# Patient Record
Sex: FEMALE | Race: BLACK OR AFRICAN AMERICAN | Employment: FULL TIME | URBAN - METROPOLITAN AREA
[De-identification: names, ages, dates, MRNs, and addresses within clinical notes are randomized per-mention and may not be internally consistent; named-entity substitution may affect disease eponyms.]

---

## 2018-06-20 PROBLEM — O09.899 HISTORY OF PRETERM DELIVERY, CURRENTLY PREGNANT: Status: ACTIVE | Noted: 2018-06-20

## 2018-06-20 PROBLEM — O09.819 ENCOUNTER FOR SUPERVISION OF PREGNANCY RESULTING FROM ASSISTED REPRODUCTIVE TECHNOLOGY: Status: ACTIVE | Noted: 2018-06-20

## 2018-06-20 PROBLEM — O09.529 ADVANCED MATERNAL AGE IN MULTIGRAVIDA: Status: ACTIVE | Noted: 2018-06-20

## 2018-06-20 PROCEDURE — 87086 URINE CULTURE/COLONY COUNT: CPT | Performed by: OBSTETRICS & GYNECOLOGY

## 2018-06-26 PROBLEM — B19.10 HEPATITIS B: Status: ACTIVE | Noted: 2018-02-01

## 2018-06-26 NOTE — PROGRESS NOTES
Outpatient Maternal-Fetal Medicine Consultation    Dear Dr. Stearns Post,    Thank you for requesting maternal fetal medicine consultation on your patient Adrian Asencio. As you are aware she is a 34year old female with a Escobar pregnancy; ALBERT 1/23/18. Ht 5' 2\" (1.575 m)   Wt 171 lb (77.6 kg)   BMI 31.28 kg/m²   General: alert and oriented,no acute distress      DISCUSSION  During her visit we discussed and reviewed the following issues:  Assisted Reproductive technology -   Conception by IVF is [facility] is more than adequate to assess for gestational diabetes and preeclampsia; hence, no further significant alterations in obstetric care are advised.     Medical Complications    Women 28years of age or older can expect to experience two to three fold higher for women 35-39 years and at 26 weeks for women 40 years and older. Fetal Malformations    Cardiac malformations, clubfoot, and diaphragmatic hernia appear to occur with increased frequency in offspring of older women.  These abnormalities are structural first was at age 10 years (in 10). The second was in 2001, she was completely dilated when she presented to the hospital.  She was kept for almost a week but they could not stop labor and she delivered. Her baby lived a few hours.   She recalls her phy a complication of infections unrelated to the genital tract, such as appendicitis, cholecystitis, pneumonia, periodontal disease, and pyelonephritis.  This likely results from transient low-grade bacteremia and/or endotoxemia leading to systemic inflammator and her . We talked about potential risks and benefits associated with tocolytic therapy and  steroid.                We discussed the preventive therapy for  labor and  delivery with 17-OH progesterone caproate 250 mg IM week anomalies, first trimester aneuploidy screening, or chorionic villus sampling prior to the procedure, if indicated. Most cerclages are placed via a vaginal approach.  The transabdominal approach is more invasive, but allows higher placement since the tra the potential for serious long-term neurodevelopmental disability.     Transabdominal placement of a cerclage at the cervicoisthmic junction appears to be a safe and effective procedure for reducing the incidence of spontaneous pregnancy loss in selected pa chorioamnionitis or (2) the pregnancy is at least 32 weeks of gestation.  In the absence of clinically apparent infection, we feel the possible increased risk of premature delivery with cerclage removal before 32 weeks outweighs the possible increased risk

## 2018-06-27 ENCOUNTER — OFFICE VISIT (OUTPATIENT)
Dept: PERINATAL CARE | Facility: HOSPITAL | Age: 38
End: 2018-06-27
Attending: OBSTETRICS & GYNECOLOGY
Payer: COMMERCIAL

## 2018-06-27 VITALS
HEIGHT: 62 IN | WEIGHT: 171 LBS | BODY MASS INDEX: 31.47 KG/M2 | SYSTOLIC BLOOD PRESSURE: 141 MMHG | DIASTOLIC BLOOD PRESSURE: 73 MMHG | HEART RATE: 81 BPM

## 2018-06-27 DIAGNOSIS — O09.899 HISTORY OF PRETERM DELIVERY, CURRENTLY PREGNANT: ICD-10-CM

## 2018-06-27 DIAGNOSIS — O09.521 ELDERLY MULTIGRAVIDA IN FIRST TRIMESTER: ICD-10-CM

## 2018-06-27 DIAGNOSIS — O34.31 CERVICAL INCOMPETENCE DURING PREGNANCY IN FIRST TRIMESTER: ICD-10-CM

## 2018-06-27 PROBLEM — O34.30 INCOMPETENT CERVIX IN PREGNANCY: Status: ACTIVE | Noted: 2018-06-27

## 2018-06-27 PROCEDURE — 99244 OFF/OP CNSLTJ NEW/EST MOD 40: CPT | Performed by: OBSTETRICS & GYNECOLOGY

## 2018-06-27 RX ORDER — CHOLECALCIFEROL (VITAMIN D3) 25 MCG
1 TABLET,CHEWABLE ORAL DAILY
COMMUNITY
End: 2018-10-31

## 2018-07-09 ENCOUNTER — TELEPHONE (OUTPATIENT)
Dept: OBGYN UNIT | Facility: HOSPITAL | Age: 38
End: 2018-07-09

## 2018-07-10 ENCOUNTER — TELEPHONE (OUTPATIENT)
Dept: OBGYN UNIT | Facility: HOSPITAL | Age: 38
End: 2018-07-10

## 2018-07-10 NOTE — PROGRESS NOTES
Giovanny Chacko  Dear Dr. Daryn Davis,     Thank you for requesting ultrasound evaluation and maternal fetal medicine consultation on your patient Adrian Asencio.   As you are aware she is a 34year old female  with a Si views. Feet: both visible but suboptimal views. Fetal heart activity: present. Fetal heart rate: 158 bpm.   Amniotic fluid: normal.   Placenta: anterior.      Summary of Ultrasound Findings:    Impression: The CRL is consistent with the gestational age advised for these patients including a comprehensive ultrasound to assess for fetal malformations (at 20 weeks) and a third trimester ultrasound assessment for fetal growth (at 32 weeks).  In addition, weekly NST's (initiating at 36 weeks gestation for wome older women does increase the chance that a women will be induced (71 inductions per fetal death averted) and thereby increases her risk of having a  delivery, only 14 additional cesareans would need to be performed to avert one unexplained fetal d  morbidity and mortality. Approximately 20 percent of  deliveries are iatrogenic and are performed for maternal or fetal indications, such as intrauterine growth restriction, preeclampsia, placenta previa, or nonreassuring fetal testing.  Of risk of  birth. Cocaine is the most common illicit substance associated with  labor in the [place], and has been detected in approximately 60 percent of women in  labor who have positive toxicology tests.  Alcohol and toluene are treatment.     Cervical cerclage was reviewed.   See prior [facility] note for details.   I strongly advise that she go on the 17-OHPC therapy [facility]), but I also recommend a prophylactic cerclage since she was told her cervix was incompetent can that she had no

## 2018-07-11 ENCOUNTER — ANESTHESIA (OUTPATIENT)
Dept: OBGYN UNIT | Facility: HOSPITAL | Age: 38
End: 2018-07-11

## 2018-07-11 ENCOUNTER — ANESTHESIA EVENT (OUTPATIENT)
Dept: OBGYN UNIT | Facility: HOSPITAL | Age: 38
End: 2018-07-11

## 2018-07-11 ENCOUNTER — SURGERY (OUTPATIENT)
Age: 38
End: 2018-07-11

## 2018-07-11 ENCOUNTER — OFFICE VISIT (OUTPATIENT)
Dept: PERINATAL CARE | Facility: HOSPITAL | Age: 38
End: 2018-07-11
Attending: OBSTETRICS & GYNECOLOGY
Payer: COMMERCIAL

## 2018-07-11 ENCOUNTER — HOSPITAL ENCOUNTER (OUTPATIENT)
Facility: HOSPITAL | Age: 38
Setting detail: OBSERVATION
Discharge: HOME | End: 2018-07-11
Attending: OBSTETRICS & GYNECOLOGY | Admitting: OBSTETRICS & GYNECOLOGY
Payer: COMMERCIAL

## 2018-07-11 VITALS
SYSTOLIC BLOOD PRESSURE: 117 MMHG | HEART RATE: 67 BPM | BODY MASS INDEX: 31.47 KG/M2 | OXYGEN SATURATION: 100 % | TEMPERATURE: 98 F | HEIGHT: 62 IN | DIASTOLIC BLOOD PRESSURE: 60 MMHG | RESPIRATION RATE: 18 BRPM | WEIGHT: 171 LBS

## 2018-07-11 VITALS
DIASTOLIC BLOOD PRESSURE: 62 MMHG | HEIGHT: 62 IN | BODY MASS INDEX: 31.47 KG/M2 | SYSTOLIC BLOOD PRESSURE: 121 MMHG | WEIGHT: 171 LBS | HEART RATE: 80 BPM

## 2018-07-11 DIAGNOSIS — O09.899 HISTORY OF PRETERM DELIVERY, CURRENTLY PREGNANT: ICD-10-CM

## 2018-07-11 DIAGNOSIS — O34.31 CERVICAL INCOMPETENCE DURING PREGNANCY IN FIRST TRIMESTER: ICD-10-CM

## 2018-07-11 DIAGNOSIS — O09.521 ELDERLY MULTIGRAVIDA IN FIRST TRIMESTER: ICD-10-CM

## 2018-07-11 PROBLEM — Z34.90 PREGNANCY: Status: ACTIVE | Noted: 2018-07-11

## 2018-07-11 LAB
BASOPHILS # BLD AUTO: 0.02 X10(3) UL (ref 0–0.1)
BASOPHILS NFR BLD AUTO: 0.4 %
EOSINOPHIL # BLD AUTO: 0.34 X10(3) UL (ref 0–0.3)
EOSINOPHIL NFR BLD AUTO: 6.3 %
ERYTHROCYTE [DISTWIDTH] IN BLOOD BY AUTOMATED COUNT: 12.5 % (ref 11.5–16)
HCT VFR BLD AUTO: 39.1 % (ref 34–50)
HGB BLD-MCNC: 13.2 G/DL (ref 12–16)
IMMATURE GRANULOCYTE COUNT: 0.01 X10(3) UL (ref 0–1)
IMMATURE GRANULOCYTE RATIO %: 0.2 %
LYMPHOCYTES # BLD AUTO: 1.47 X10(3) UL (ref 0.9–4)
LYMPHOCYTES NFR BLD AUTO: 27.2 %
MCH RBC QN AUTO: 29.5 PG (ref 27–33.2)
MCHC RBC AUTO-ENTMCNC: 33.8 G/DL (ref 31–37)
MCV RBC AUTO: 87.3 FL (ref 81–100)
MONOCYTES # BLD AUTO: 0.42 X10(3) UL (ref 0.1–1)
MONOCYTES NFR BLD AUTO: 7.8 %
NEUTROPHIL ABS PRELIM: 3.15 X10 (3) UL (ref 1.3–6.7)
NEUTROPHILS # BLD AUTO: 3.15 X10(3) UL (ref 1.3–6.7)
NEUTROPHILS NFR BLD AUTO: 58.1 %
PLATELET # BLD AUTO: 355 10(3)UL (ref 150–450)
RBC # BLD AUTO: 4.48 X10(6)UL (ref 3.8–5.1)
RED CELL DISTRIBUTION WIDTH-SD: 39.9 FL (ref 35.1–46.3)
WBC # BLD AUTO: 5.4 X10(3) UL (ref 4–13)

## 2018-07-11 PROCEDURE — 76813 OB US NUCHAL MEAS 1 GEST: CPT | Performed by: OBSTETRICS & GYNECOLOGY

## 2018-07-11 PROCEDURE — 59320 REVISION OF CERVIX: CPT

## 2018-07-11 PROCEDURE — 0UVC7ZZ RESTRICTION OF CERVIX, VIA NATURAL OR ARTIFICIAL OPENING: ICD-10-PCS | Performed by: OBSTETRICS & GYNECOLOGY

## 2018-07-11 PROCEDURE — 85025 COMPLETE CBC W/AUTO DIFF WBC: CPT | Performed by: OBSTETRICS & GYNECOLOGY

## 2018-07-11 PROCEDURE — 99214 OFFICE O/P EST MOD 30 MIN: CPT | Performed by: OBSTETRICS & GYNECOLOGY

## 2018-07-11 RX ORDER — ACETAMINOPHEN 325 MG/1
TABLET ORAL
Status: COMPLETED
Start: 2018-07-11 | End: 2018-07-11

## 2018-07-11 RX ORDER — SODIUM CHLORIDE, SODIUM LACTATE, POTASSIUM CHLORIDE, CALCIUM CHLORIDE 600; 310; 30; 20 MG/100ML; MG/100ML; MG/100ML; MG/100ML
INJECTION, SOLUTION INTRAVENOUS CONTINUOUS
Status: DISCONTINUED | OUTPATIENT
Start: 2018-07-11 | End: 2018-07-11

## 2018-07-11 RX ORDER — NALBUPHINE HCL 10 MG/ML
2.5 AMPUL (ML) INJECTION
Status: DISCONTINUED | OUTPATIENT
Start: 2018-07-11 | End: 2018-07-11

## 2018-07-11 RX ORDER — ACETAMINOPHEN 325 MG/1
650 TABLET ORAL ONCE AS NEEDED
Status: COMPLETED | OUTPATIENT
Start: 2018-07-11 | End: 2018-07-11

## 2018-07-11 RX ORDER — INDOMETHACIN 50 MG/1
50 CAPSULE ORAL EVERY 6 HOURS PRN
Status: DISCONTINUED | OUTPATIENT
Start: 2018-07-11 | End: 2018-07-11

## 2018-07-11 RX ORDER — INDOMETHACIN 50 MG/1
50 CAPSULE ORAL EVERY 6 HOURS PRN
Qty: 7 CAPSULE | Refills: 0 | Status: ON HOLD | OUTPATIENT
Start: 2018-07-11 | End: 2018-09-26

## 2018-07-11 RX ORDER — CEFAZOLIN SODIUM/WATER 2 G/20 ML
2 SYRINGE (ML) INTRAVENOUS ONCE
Status: COMPLETED | OUTPATIENT
Start: 2018-07-11 | End: 2018-07-11

## 2018-07-11 RX ORDER — ONDANSETRON 2 MG/ML
4 INJECTION INTRAMUSCULAR; INTRAVENOUS ONCE AS NEEDED
Status: DISCONTINUED | OUTPATIENT
Start: 2018-07-11 | End: 2018-07-11

## 2018-07-11 RX ORDER — DIPHENHYDRAMINE HYDROCHLORIDE 50 MG/ML
25 INJECTION INTRAMUSCULAR; INTRAVENOUS ONCE AS NEEDED
Status: DISCONTINUED | OUTPATIENT
Start: 2018-07-11 | End: 2018-07-11

## 2018-07-11 NOTE — PLAN OF CARE
Problem: Patient/Family Goals  Goal: Patient/Family Long Term Goal  Patient's Long Term Goal: uncomplicated cerclage placement    Interventions:  -   - See additional Care Plan goals for specific interventions   Outcome: Progressing    Goal: Patient/Family

## 2018-07-11 NOTE — PROGRESS NOTES
Pt moved via pt cart to antepartum room 118, IV infusing well. Pt without urge to void as yet. Peripad applied prior to transfer.

## 2018-07-11 NOTE — OPERATIVE REPORT
Preoperative Diagnosis:  1. IUP 12w0d                                      2.   H/O Incompetent cervix                                         Postoperative diagnosis:    Same    Procedure:   Cervical Cerclage    Surgeon:  NOREEN Hong Blo

## 2018-07-11 NOTE — PROGRESS NOTES
Pt is a 34year old female admitted to TRG1/TRG1-A, Patient presents with:   Assessment: cerclage placement     Pt is 12w0d intra-uterine pregnancy. Denies any leaking of fluid. Reports +fetal movement. History obtained, consents signed.  Shy Jean-Baptiste

## 2018-07-11 NOTE — PROGRESS NOTES
Pt. Here for 1st trimester ultrasound. Pt. Accompanied by mother -in-law. No fetal movement.   No complaints

## 2018-07-11 NOTE — ANESTHESIA POSTPROCEDURE EVALUATION
[address] Patient Status:  Observation   Age/Gender 34year old female MRN [id number]   Location [facility] Attending Declan Conroy, MD Paresh Banerjee Day # 0 PCP No primary care provider on file.        Anesthesia P

## 2018-07-11 NOTE — H&P
[facility] H&P    Date of Admission:  2018  Date of Consult:  2018      History of Present Illness:  Curt Acuna is a a(n) 34year old female.   with an IUP at Gestational Age: 13w0d with a h/o infusion, , Intravenous, Continuous  •  ceFAZolin sodium (ANCEF/KEFZOL) 2 GM/20ML premix IV syringe 2 g, 2 g, Intravenous, Once    Physical examination:  Physical Exam  Physical Exam:   General: Alert, orientated x3. Cooperative. No apparent distress.   V

## 2018-07-12 NOTE — PLAN OF CARE
Pt able to void another 200 cc. Ambulated to bathroom, tolerated well. Pt denies any cramping, bleeding, or concerns. Discharge instructions reviewed with patient, indocin prescription reviewed with patient.  labor s/s reviewed with patient.  Pt awar

## 2018-07-16 NOTE — PROGRESS NOTES
Stefanie Fagan    Dear Dr. Manuel Mike    Thank you for requesting ultrasound evaluation and maternal fetal medicine consultation on your patient Angela Schulte.   As you are aware she is a 34year old female  with a Singl donor ~35 (her partner)  · Hepatitis B - noted in her history but not discussed today     RECOMMENDATIONS:  · Continue care with Dr. Borjas Fairly  · Begin weekly 17-OHPC at 16 weeks  · Level II ultrasound & CL at 20 weeks  · Fetal echocardiogram at ~24 weeks  · F

## 2018-07-17 ENCOUNTER — OFFICE VISIT (OUTPATIENT)
Dept: PERINATAL CARE | Facility: HOSPITAL | Age: 38
End: 2018-07-17
Attending: OBSTETRICS & GYNECOLOGY
Payer: COMMERCIAL

## 2018-07-17 VITALS — HEART RATE: 84 BPM | DIASTOLIC BLOOD PRESSURE: 64 MMHG | SYSTOLIC BLOOD PRESSURE: 130 MMHG

## 2018-07-17 DIAGNOSIS — O09.521 ELDERLY MULTIGRAVIDA IN FIRST TRIMESTER: ICD-10-CM

## 2018-07-17 DIAGNOSIS — O34.31 CERVICAL INCOMPETENCE DURING PREGNANCY IN FIRST TRIMESTER: ICD-10-CM

## 2018-07-17 PROCEDURE — 76817 TRANSVAGINAL US OBSTETRIC: CPT | Performed by: OBSTETRICS & GYNECOLOGY

## 2018-07-17 PROCEDURE — 99213 OFFICE O/P EST LOW 20 MIN: CPT | Performed by: OBSTETRICS & GYNECOLOGY

## 2018-07-25 ENCOUNTER — LAB ENCOUNTER (OUTPATIENT)
Dept: LAB | Facility: HOSPITAL | Age: 38
End: 2018-07-25
Attending: OBSTETRICS & GYNECOLOGY
Payer: COMMERCIAL

## 2018-07-25 DIAGNOSIS — Z36.9 ENCOUNTER FOR ANTENATAL SCREENING OF MOTHER: ICD-10-CM

## 2018-07-25 LAB
ANTIBODY SCREEN: NEGATIVE
BASOPHILS # BLD AUTO: 0.03 X10(3) UL (ref 0–0.1)
BASOPHILS NFR BLD AUTO: 0.5 %
EOSINOPHIL # BLD AUTO: 0.48 X10(3) UL (ref 0–0.3)
EOSINOPHIL NFR BLD AUTO: 7.6 %
ERYTHROCYTE [DISTWIDTH] IN BLOOD BY AUTOMATED COUNT: 12.6 % (ref 11.5–16)
HBV SURFACE AG SER-ACNC: <0.1 [IU]/L
HBV SURFACE AG SERPL QL IA: NONREACTIVE
HCT VFR BLD AUTO: 35.7 % (ref 34–50)
HGB BLD-MCNC: 11.9 G/DL (ref 12–16)
IMMATURE GRANULOCYTE COUNT: 0.02 X10(3) UL (ref 0–1)
IMMATURE GRANULOCYTE RATIO %: 0.3 %
LYMPHOCYTES # BLD AUTO: 1.4 X10(3) UL (ref 0.9–4)
LYMPHOCYTES NFR BLD AUTO: 22.2 %
MCH RBC QN AUTO: 29.7 PG (ref 27–33.2)
MCHC RBC AUTO-ENTMCNC: 33.3 G/DL (ref 31–37)
MCV RBC AUTO: 89 FL (ref 81–100)
MONOCYTES # BLD AUTO: 0.46 X10(3) UL (ref 0.1–1)
MONOCYTES NFR BLD AUTO: 7.3 %
NEUTROPHIL ABS PRELIM: 3.93 X10 (3) UL (ref 1.3–6.7)
NEUTROPHILS # BLD AUTO: 3.93 X10(3) UL (ref 1.3–6.7)
NEUTROPHILS NFR BLD AUTO: 62.1 %
PLATELET # BLD AUTO: 355 10(3)UL (ref 150–450)
RBC # BLD AUTO: 4.01 X10(6)UL (ref 3.8–5.1)
RED CELL DISTRIBUTION WIDTH-SD: 41.6 FL (ref 35.1–46.3)
RH BLOOD TYPE: POSITIVE
RUBV IGG SER QL: POSITIVE
RUBV IGG SER-ACNC: 153.1 IU/ML (ref 10–?)
T PALLIDUM AB SER QL IA: NONREACTIVE
WBC # BLD AUTO: 6.3 X10(3) UL (ref 4–13)

## 2018-07-25 PROCEDURE — 86900 BLOOD TYPING SEROLOGIC ABO: CPT

## 2018-07-25 PROCEDURE — 36415 COLL VENOUS BLD VENIPUNCTURE: CPT

## 2018-07-25 PROCEDURE — 85025 COMPLETE CBC W/AUTO DIFF WBC: CPT

## 2018-07-25 PROCEDURE — 87389 HIV-1 AG W/HIV-1&-2 AB AG IA: CPT

## 2018-07-25 PROCEDURE — 86850 RBC ANTIBODY SCREEN: CPT

## 2018-07-25 PROCEDURE — 87340 HEPATITIS B SURFACE AG IA: CPT

## 2018-07-25 PROCEDURE — 86780 TREPONEMA PALLIDUM: CPT

## 2018-07-25 PROCEDURE — 86762 RUBELLA ANTIBODY: CPT

## 2018-07-25 PROCEDURE — 86901 BLOOD TYPING SEROLOGIC RH(D): CPT

## 2018-09-04 NOTE — PROGRESS NOTES
Radha Walden  Dear Dr. Thierno Batres,     Thank you for requesting ultrasound evaluation and maternal fetal medicine consultation on your patient Roberto Gonzalez.   As you are aware she is a 34year old female  with a Si 22w1d)  .6 mm 81st% 21w1d (20w3d to 21w6d)  FL 32.4 mm 46th% 20w1d (18w2d to 21w6d)  OFD 64.6 mm 91st% 21w0d  TCD 21.2 mm 72nd% 20w5d  HUM 30.9 mm 62nd% 20w2d  VENTRp 6.5 mm n/a  CM 5.5 mm 67th%  NUCHAL FOLD 4.03 mm  HC/AC Ratio 1.141  29th%  FL/AC R mm  Cervical length with fundal pressure: 31 mm. Cervix pre-stitch: 4.0 mm. Cervix post-stitch: 26.0 mm.  ____________________________________________________________________________  Comments:  Impression:  1. IUP at 20w0d  2.   Normal level II ultras

## 2018-09-05 ENCOUNTER — OFFICE VISIT (OUTPATIENT)
Dept: PERINATAL CARE | Facility: HOSPITAL | Age: 38
End: 2018-09-05
Attending: OBSTETRICS & GYNECOLOGY
Payer: COMMERCIAL

## 2018-09-05 VITALS
WEIGHT: 180 LBS | SYSTOLIC BLOOD PRESSURE: 142 MMHG | DIASTOLIC BLOOD PRESSURE: 71 MMHG | BODY MASS INDEX: 33 KG/M2 | HEART RATE: 77 BPM

## 2018-09-05 DIAGNOSIS — O09.899 HISTORY OF PRETERM DELIVERY, CURRENTLY PREGNANT: ICD-10-CM

## 2018-09-05 DIAGNOSIS — O09.522 ELDERLY MULTIGRAVIDA IN SECOND TRIMESTER: ICD-10-CM

## 2018-09-05 DIAGNOSIS — O34.32 CERVICAL INCOMPETENCE DURING PREGNANCY IN SECOND TRIMESTER: ICD-10-CM

## 2018-09-05 PROCEDURE — 99215 OFFICE O/P EST HI 40 MIN: CPT | Performed by: OBSTETRICS & GYNECOLOGY

## 2018-09-05 PROCEDURE — 76817 TRANSVAGINAL US OBSTETRIC: CPT | Performed by: OBSTETRICS & GYNECOLOGY

## 2018-09-05 PROCEDURE — 76811 OB US DETAILED SNGL FETUS: CPT | Performed by: OBSTETRICS & GYNECOLOGY

## 2018-09-25 NOTE — PROGRESS NOTES
Beena Veronica  Dear Dr. Wilfrid Valles,     Thank you for requesting ultrasound evaluation and maternal fetal medicine consultation on your patient Idalia Torres.  As you are aware she is a 34year old female  with a Si Normal  Position of apex:  normal  Systemic veins:  Normal  Pulmonary veins:  Normal  Atrial septum:  Normal  Atrioventricular junction:  Concordant, patent AV valves, equal size  Atrioventricular valve regurgit.:  None  Ventricular septum:  Intact  Ventr results with the patient. Impression:  1. IUP at 23w0d  2. Normal fetal echocardiogram  3.   Incompetent cervix s/p cerclage but there is funneling past the cerclage suture.       I interpreted the results and reviewed them with the patient.     DIS Jennifer  · Currently admit for observation and repeat CL tomorrow  · Begin nightly vaginal progesterone 9/26/18    Outpatient plan -   · Follow-up Growth ultrasound at 32 weeks. · Weekly NST's at 34 weeks.   · Twice weekly testing at 38 weeks - weekly NST an

## 2018-09-26 ENCOUNTER — OFFICE VISIT (OUTPATIENT)
Dept: PERINATAL CARE | Facility: HOSPITAL | Age: 38
End: 2018-09-26
Attending: OBSTETRICS & GYNECOLOGY
Payer: COMMERCIAL

## 2018-09-26 ENCOUNTER — HOSPITAL ENCOUNTER (INPATIENT)
Facility: HOSPITAL | Age: 38
LOS: 12 days | Discharge: HOME | DRG: 819 | End: 2018-10-08
Attending: OBSTETRICS & GYNECOLOGY | Admitting: OBSTETRICS & GYNECOLOGY
Payer: COMMERCIAL

## 2018-09-26 VITALS
WEIGHT: 180 LBS | BODY MASS INDEX: 33.13 KG/M2 | HEIGHT: 62 IN | DIASTOLIC BLOOD PRESSURE: 76 MMHG | HEART RATE: 80 BPM | SYSTOLIC BLOOD PRESSURE: 132 MMHG

## 2018-09-26 DIAGNOSIS — O34.32 CERVICAL INCOMPETENCE DURING PREGNANCY IN SECOND TRIMESTER: ICD-10-CM

## 2018-09-26 DIAGNOSIS — O09.819 ENCOUNTER FOR SUPERVISION OF PREGNANCY RESULTING FROM ASSISTED REPRODUCTIVE TECHNOLOGY: ICD-10-CM

## 2018-09-26 DIAGNOSIS — O09.522 ELDERLY MULTIGRAVIDA IN SECOND TRIMESTER: ICD-10-CM

## 2018-09-26 PROCEDURE — 76827 ECHO EXAM OF FETAL HEART: CPT | Performed by: OBSTETRICS & GYNECOLOGY

## 2018-09-26 PROCEDURE — 76825 ECHO EXAM OF FETAL HEART: CPT | Performed by: OBSTETRICS & GYNECOLOGY

## 2018-09-26 PROCEDURE — 76817 TRANSVAGINAL US OBSTETRIC: CPT | Performed by: OBSTETRICS & GYNECOLOGY

## 2018-09-26 PROCEDURE — 93325 DOPPLER ECHO COLOR FLOW MAPG: CPT | Performed by: OBSTETRICS & GYNECOLOGY

## 2018-09-26 PROCEDURE — 81003 URINALYSIS AUTO W/O SCOPE: CPT | Performed by: OBSTETRICS & GYNECOLOGY

## 2018-09-26 PROCEDURE — 99215 OFFICE O/P EST HI 40 MIN: CPT | Performed by: OBSTETRICS & GYNECOLOGY

## 2018-09-26 PROCEDURE — 85025 COMPLETE CBC W/AUTO DIFF WBC: CPT | Performed by: OBSTETRICS & GYNECOLOGY

## 2018-09-26 RX ORDER — ACETAMINOPHEN 500 MG
1000 TABLET ORAL EVERY 6 HOURS PRN
Status: DISCONTINUED | OUTPATIENT
Start: 2018-09-26 | End: 2018-10-08

## 2018-09-26 RX ORDER — DOCUSATE SODIUM 100 MG/1
100 CAPSULE, LIQUID FILLED ORAL 2 TIMES DAILY
Status: DISCONTINUED | OUTPATIENT
Start: 2018-09-26 | End: 2018-10-08

## 2018-09-26 RX ORDER — CHOLECALCIFEROL (VITAMIN D3) 25 MCG
1 TABLET,CHEWABLE ORAL NIGHTLY
Status: DISCONTINUED | OUTPATIENT
Start: 2018-09-26 | End: 2018-09-29

## 2018-09-26 RX ORDER — ACETAMINOPHEN 500 MG
500 TABLET ORAL EVERY 6 HOURS PRN
Status: DISCONTINUED | OUTPATIENT
Start: 2018-09-26 | End: 2018-10-08

## 2018-09-26 RX ORDER — ZOLPIDEM TARTRATE 5 MG/1
5 TABLET ORAL NIGHTLY PRN
Status: DISCONTINUED | OUTPATIENT
Start: 2018-09-26 | End: 2018-10-08

## 2018-09-26 RX ORDER — CALCIUM CARBONATE 200(500)MG
1000 TABLET,CHEWABLE ORAL
Status: DISCONTINUED | OUTPATIENT
Start: 2018-09-26 | End: 2018-10-08

## 2018-09-26 NOTE — H&P
[address] and Delivery Prenatal History and Physical Interval Addendum  Please see full Prenatal Record for this pregnancy      SUBJECTIVE:    Interval History:      This is a pregnancy at 23 weeks admitted for incompetent cervix    Patient fol

## 2018-09-26 NOTE — PLAN OF CARE
Problem: Patient/Family Goals  Goal: Patient/Family Long Term Goal  Patient's Long Term Goal: Adequate pain relief    Interventions:  -   - See additional Care Plan goals for specific interventions   Outcome: Progressing    Goal: Patient/Family Short Term

## 2018-09-26 NOTE — PROGRESS NOTES
Pt is a 34year old female admitted to 122/122-A, Patient presents with:  R/o  Labor: Pt here from Dr. Robert Piña office for observaton. Pt has a history of  delivery x2 at 23 wk and with this pregnancy cerclage was placed 18/.   Pt states \

## 2018-09-27 ENCOUNTER — ANESTHESIA EVENT (OUTPATIENT)
Dept: OBGYN UNIT | Facility: HOSPITAL | Age: 38
DRG: 819 | End: 2018-09-27
Payer: COMMERCIAL

## 2018-09-27 ENCOUNTER — ANESTHESIA (OUTPATIENT)
Dept: OBGYN UNIT | Facility: HOSPITAL | Age: 38
DRG: 819 | End: 2018-09-27
Payer: COMMERCIAL

## 2018-09-27 PROCEDURE — 86900 BLOOD TYPING SEROLOGIC ABO: CPT | Performed by: OBSTETRICS & GYNECOLOGY

## 2018-09-27 PROCEDURE — 76817 TRANSVAGINAL US OBSTETRIC: CPT | Performed by: OBSTETRICS & GYNECOLOGY

## 2018-09-27 PROCEDURE — 86901 BLOOD TYPING SEROLOGIC RH(D): CPT | Performed by: OBSTETRICS & GYNECOLOGY

## 2018-09-27 PROCEDURE — 86850 RBC ANTIBODY SCREEN: CPT | Performed by: OBSTETRICS & GYNECOLOGY

## 2018-09-27 PROCEDURE — 0UVC8ZZ RESTRICTION OF CERVIX, VIA NATURAL OR ARTIFICIAL OPENING ENDOSCOPIC: ICD-10-PCS | Performed by: OBSTETRICS & GYNECOLOGY

## 2018-09-27 PROCEDURE — 85025 COMPLETE CBC W/AUTO DIFF WBC: CPT | Performed by: OBSTETRICS & GYNECOLOGY

## 2018-09-27 PROCEDURE — 59320 REVISION OF CERVIX: CPT

## 2018-09-27 RX ORDER — MORPHINE SULFATE 4 MG/ML
2 INJECTION, SOLUTION INTRAMUSCULAR; INTRAVENOUS EVERY 5 MIN PRN
Status: ACTIVE | OUTPATIENT
Start: 2018-09-27 | End: 2018-09-28

## 2018-09-27 RX ORDER — ASPIRIN 81 MG/1
81 TABLET, CHEWABLE ORAL DAILY
Status: DISCONTINUED | OUTPATIENT
Start: 2018-09-27 | End: 2018-10-08

## 2018-09-27 RX ORDER — BETAMETHASONE SODIUM PHOSPHATE AND BETAMETHASONE ACETATE 3; 3 MG/ML; MG/ML
12 INJECTION, SUSPENSION INTRA-ARTICULAR; INTRALESIONAL; INTRAMUSCULAR; SOFT TISSUE EVERY 24 HOURS
Status: COMPLETED | OUTPATIENT
Start: 2018-09-28 | End: 2018-09-29

## 2018-09-27 RX ORDER — ONDANSETRON 2 MG/ML
4 INJECTION INTRAMUSCULAR; INTRAVENOUS ONCE AS NEEDED
Status: ACTIVE | OUTPATIENT
Start: 2018-09-27 | End: 2018-09-27

## 2018-09-27 RX ORDER — CEFAZOLIN SODIUM/WATER 2 G/20 ML
2 SYRINGE (ML) INTRAVENOUS EVERY 8 HOURS
Status: COMPLETED | OUTPATIENT
Start: 2018-09-27 | End: 2018-09-29

## 2018-09-27 RX ORDER — SUCRALFATE 1 G/1
1 TABLET ORAL EVERY 6 HOURS
Status: DISCONTINUED | OUTPATIENT
Start: 2018-09-27 | End: 2018-09-27

## 2018-09-27 RX ORDER — INDOMETHACIN 50 MG/1
100 CAPSULE ORAL ONCE
Status: COMPLETED | OUTPATIENT
Start: 2018-09-27 | End: 2018-09-27

## 2018-09-27 RX ORDER — INDOMETHACIN 50 MG/1
50 CAPSULE ORAL EVERY 6 HOURS
Status: COMPLETED | OUTPATIENT
Start: 2018-09-27 | End: 2018-09-29

## 2018-09-27 RX ORDER — NALBUPHINE HCL 10 MG/ML
2.5 AMPUL (ML) INJECTION
Status: DISCONTINUED | OUTPATIENT
Start: 2018-09-27 | End: 2018-09-30

## 2018-09-27 RX ORDER — CHOLECALCIFEROL (VITAMIN D3) 25 MCG
1 TABLET,CHEWABLE ORAL DAILY
Status: DISCONTINUED | OUTPATIENT
Start: 2018-09-27 | End: 2018-09-27

## 2018-09-27 RX ORDER — DEXTROSE, SODIUM CHLORIDE, SODIUM LACTATE, POTASSIUM CHLORIDE, AND CALCIUM CHLORIDE 5; .6; .31; .03; .02 G/100ML; G/100ML; G/100ML; G/100ML; G/100ML
INJECTION, SOLUTION INTRAVENOUS CONTINUOUS
Status: DISCONTINUED | OUTPATIENT
Start: 2018-09-27 | End: 2018-09-30

## 2018-09-27 RX ORDER — METRONIDAZOLE 500 MG/100ML
500 INJECTION, SOLUTION INTRAVENOUS EVERY 8 HOURS
Status: COMPLETED | OUTPATIENT
Start: 2018-09-27 | End: 2018-09-29

## 2018-09-27 RX ORDER — SUCRALFATE 1 G/1
1 TABLET ORAL EVERY 6 HOURS PRN
Status: DISCONTINUED | OUTPATIENT
Start: 2018-09-27 | End: 2018-09-30

## 2018-09-27 RX ORDER — SODIUM CHLORIDE, SODIUM LACTATE, POTASSIUM CHLORIDE, CALCIUM CHLORIDE 600; 310; 30; 20 MG/100ML; MG/100ML; MG/100ML; MG/100ML
INJECTION, SOLUTION INTRAVENOUS CONTINUOUS
Status: DISCONTINUED | OUTPATIENT
Start: 2018-09-27 | End: 2018-09-30

## 2018-09-27 NOTE — CONSULTS
[facility]  Maternal-Fetal Medicine Inpatient Consultation    Date of Admission:  9/26/2018  Date of Consult:  9/27/2018    Reason for Consult:   Incompetent cervix with funneling past the cerclage.     History of Present Illness:  Colletta Leach reports that she quit smoking about 15 years ago. she has never used smokeless tobacco. She reports that she does not drink alcohol or use drugs.     Allergies:  No Known Allergies    Medications:    Current Facility-Administered Medications:   •  met .0 09/26/2018       Fetal Ultrasound:    Cephalic, anterior placenta, normal FHT. Transvaginal US: U-shaped funneling, progressed from yesterday.   CL is 6.8 mm; the external os appears slightly dilated      NARRATIVE:   PREOPERATIVE COUNSELING  I r bedrest  2. Continue vaginal progesterone nightly  3. Indocin for ~48 hours  4. Rescue cerclage today at ~ noon    Thank you for allowing me to participate in the care of your patient.   Please do not hesitate to contact me if additional questions or con

## 2018-09-27 NOTE — PROGRESS NOTES
Pt to MFM per w/c accompanied by OBT per Dr. Lehman Enter order. POC discussed, pt reports \"some radha courtney\" throughout night but denies pains or discomforts. Abdomen soft, non-tender to palpation.  + FM

## 2018-09-27 NOTE — PROGRESS NOTES
Rec'd call per Dr. Yoana Wright, [facility] [place] discussed and orders rec'd.

## 2018-09-27 NOTE — IMAGING NOTE
History: Age: 34 years. Maternal age at [facility]: 34 years. : 3 Para: 2. Last menstrual period: 2018.  ____________________________________________________________________________  General Evaluation:  Fetal heart activity: present.  Fetal heart rat

## 2018-09-27 NOTE — PROGRESS NOTES
Dr. Janie Quinonez at bedside to discuss  anticipatory care and answer pt and wife's questions concerning gestational age/delivery/resuscitation

## 2018-09-27 NOTE — PROGRESS NOTES
Pt rec'd in room 122 per cart accompanied by OBT,RN, and wife. POC discussed w/ pt and questions answered. Pt denies nausea at this time.  Clear liquids given and pt instructed to attempt clear liquids first and see how pt tolerates and then may order regul

## 2018-09-27 NOTE — OPERATIVE REPORT
Preoperative Diagnosis:  1.    IUP 23w1d                                      2.   Incompetent cervix, funneling past the prior cerclage                                      3.   H/o delivery at 23 weeks x 2    Postoperative diagnosis:    Same    Procedure:

## 2018-09-27 NOTE — PROGRESS NOTES
PERICARE GIVEN AND MINIPAD PLACED TO OBSERVE FOR ANY VAGINAL BLEEDING OR DISCHARGE.   PT MOVED WELL FROM SIDE TO SIDE AND TAKEN TO HER ANTEPARTUM ROOM VIA PT CART IN STABLE CONDITION

## 2018-09-27 NOTE — ANESTHESIA PREPROCEDURE EVALUATION
PRE-OP EVALUATION    Patient Name: Nazia Cutler    Pre-op Diagnosis: 23 1/7 weeks gestation,     Procedure(s):      Surgeon(s) and Role:     * Becca Perez MD - Primary    Pre-op vitals reviewed.   Temp: 98.5 °F (36.9 °C)  Pulse: 62  Resp: 20  BP: 1 Performed by Yasmin Wilks MD at [facility] L[facility]D OR  04/2018: OTHER SURGICAL HISTORY      Comment:  uterine polypectomy   1998: OTHER SURGICAL HISTORY      Comment:  breast augmentation   Social History    Tobacco Use      Smoking status: Former Smoker        Quit

## 2018-09-27 NOTE — PROGRESS NOTES
Antepartum PN    Patient with some irritability on monitor overnight but not feeling contractions. No lof, no vb, +FM.     /69 (BP Location: Right arm)   Pulse 62   Temp 98.5 °F (36.9 °C) (Oral)   Resp 20   Ht 5' 2\" (1.575 m)   Wt 180 lb (81.6 kg)

## 2018-09-27 NOTE — PROGRESS NOTES
Report received from [place], [place]. Pt denies any complaints. +FM, denies lof, bleeding, ctx. POC discussed with pt, pt denies any questions. Call light within reach.

## 2018-09-27 NOTE — PAYOR COMM NOTE
--------------  ADMISSION REVIEW     Payor: Malcolm MONSON  Subscriber #:  [id number]  Authorization Number: [id number]    Admit date: 9/26/18  Admit time: 1027       Admitting Physician: Marcelo Kumari MD  Attending Physician:  Marcelo Kumari MD condition. She will remain as an inpatient. Will continue antibiotic and indocin for a total of 48 hours.         MEDICATIONS ADMINISTERED IN LAST 1 DAY:  acetaminophen (TYLENOL EXTRA STRENGTH) tab 1,000 mg     Date Action Dose Route User    9/27/2018 154

## 2018-09-27 NOTE — PROGRESS NOTES
09/27/18 1128   Clinical Encounter Type   Visited With Patient   Routine Visit (Responded to the page)   Continue Visiting (Encouraged Trip De Jesus to call  anytime for continuity of care/support.  )   Patient's Supportive Strategies/Resources Identi

## 2018-09-28 NOTE — PROGRESS NOTES
Antepartum PN    No acute events overnight. Denies contractions, no lof, scant spotting, +FM. Spoke with neonatology yesterday, patient desires resuscitation if labor at this time. Would defer aggressive resuscitation with chest compressions.     /

## 2018-09-28 NOTE — PAYOR COMM NOTE
--------------  CONTINUED STAY REVIEW        9/27                    Antepartum PN     Patient with some irritability on monitor overnight but not feeling contractions.   No lof, no vb, +FM.     /69 (BP Location: Right arm)   Pulse 62   Temp 98.5 °F (

## 2018-09-28 NOTE — CONSULTS
Patient Name: Glenn Burgos  Date:  09/27/18  Requesting Physician:  Dr Shelli Calderon MD       Thank you for the consultation. I had the opportunity to speak with Obie Akhtar in her room with her partner Daxa on the phone.   This was a prenatal consultation r TPN and central lines while feeding gets established.  Long term complications of premature birth such as neurodevelopmental concerns were mentioned and I stated that these are likely to be negatively impacted by the other complications such as severe sepsi nurse and respiratory therapist would attend the  delivery. The initial management would include an assessment at birth confirming the gestation followed by evaluation for RDS.  Based on this assessment of the baby, intervention strategies for RDS is

## 2018-09-28 NOTE — CONSULTS
[facility]    Maternal Fetal Medicine Progress Note    Curtis Minor Patient Status:  Inpatient    1980 MRN [id number]   Location 18[facility] Attending Don Haile MD   Hosp Day # 2 PCP No primary care provider on

## 2018-09-29 NOTE — PROGRESS NOTES
RN to bedside, introductions made, ID band verified and assessment completed. Patient denies pain, abdominal tightening or cramping. Patient denies leaking of fluid or vaginal bleeding. + fetal movement.  Discussed POC with patient, questions answered, david 98

## 2018-09-29 NOTE — CONSULTS
[facility]    Maternal Fetal Medicine Progress Note    Christophe Jules Patient Status:  Inpatient    1980 MRN [id number]   Location 1818 [facility] Attending Jf Saldana MD   [facility] Day # 3 PCP No primary care provider on PM

## 2018-09-30 PROCEDURE — 86850 RBC ANTIBODY SCREEN: CPT | Performed by: OBSTETRICS & GYNECOLOGY

## 2018-09-30 PROCEDURE — 86900 BLOOD TYPING SEROLOGIC ABO: CPT | Performed by: OBSTETRICS & GYNECOLOGY

## 2018-09-30 PROCEDURE — 86901 BLOOD TYPING SEROLOGIC RH(D): CPT | Performed by: OBSTETRICS & GYNECOLOGY

## 2018-09-30 PROCEDURE — 85025 COMPLETE CBC W/AUTO DIFF WBC: CPT | Performed by: OBSTETRICS & GYNECOLOGY

## 2018-09-30 NOTE — PROGRESS NOTES
Rec'd call per Dr. Sri Salazar. Report of pt status given and POC discussed. Orders rec'd. Pt denies questions or concerns for Dr. Sri Salazar.

## 2018-09-30 NOTE — PROGRESS NOTES
Antepartum Progress Note    No complaints. No vaginal bleeding, no LOF. No cramping.   Temp:  [98.2 °F (36.8 °C)-99.4 °F (37.4 °C)] 98.5 °F (36.9 °C)  Pulse:  [75-77] 76  Resp:  [18-22] 18  BP: (116-124)/(56-70) 116/65  FHT:  150s, appropriate for gestation

## 2018-10-01 PROCEDURE — 76817 TRANSVAGINAL US OBSTETRIC: CPT | Performed by: OBSTETRICS & GYNECOLOGY

## 2018-10-01 PROCEDURE — 76805 OB US >/= 14 WKS SNGL FETUS: CPT | Performed by: OBSTETRICS & GYNECOLOGY

## 2018-10-01 NOTE — IMAGING NOTE
History: Age: 34 years. Maternal age at [facility]: 34 years.  : 3 Para: 2.  ____________________________________________________________________________  Dating:  LMP: 2018 EDC: 2019 GA by LMP: 23w5d  Current Scan on: 10/01/2018 EDC: 2019 delivery before 33 weeks: 0.73 %.  ____________________________________________________________________________  Comments:  Impression:  1. IUP at 23w5d  2. Incompetent cervix s/p cerclage; second cerclage is holding  3.   Scan consistent with dates; EFW

## 2018-10-01 NOTE — PAYOR COMM NOTE
--------------  CONTINUED STAY REVIEW    Payor: Manuela ESPOSITO/EDDIE  Subscriber #:  [id number]  Authorization Number: [id number]    Admit date: 9/26/18  Admit time: 1027    Admitting Physician: Felipa Casey MD  Attending Physician:  Tarri Ganser family at bedside.     Total Time spent with patient and coordinating care:  15 minutes        Abelardo Jones M.D.  9/29/2018  12:14 PM                   Electronically signed by Jasvir Najera MD at 9/29/2018 12:45 PM    ASSESSMENT/PLAN:  06P3W ADMINISTERED IN LAST 1 DAY:  aspirin chewable tab 81 mg     Date Action Dose Route User    10/1/2018 0901 Given 81 mg Oral Kip MACIEL RN      docusate sodium (COLACE) cap 100 mg     Date Action Dose Route User    10/1/2018 0901 Given 100 mg Oral Isacc

## 2018-10-01 NOTE — PROGRESS NOTES
SUBJECTIVE:   pt without complaints. No contractions. no abdominal pain. Good fetal movement. no vaginal discharge    OBJECTIVE:  VS:  height is 5' 2\" (1.575 m) and weight is 180 lb (81.6 kg). Her oral temperature is 98.2 °F (36.8 °C).  Her blood pressure

## 2018-10-01 NOTE — CONSULTS
[facility]    Maternal Fetal Medicine Progress Note    David Page Patient Status:  Inpatient    1980 MRN [id number]   Location 1818 [facility] Attending Akshat Cox MD   Hosp Day # 5 PCP No primary care provider on

## 2018-10-02 NOTE — CONSULTS
[facility]    Maternal Fetal Medicine Progress Note    Cecelia Barnett Patient Status:  Inpatient    1980 MRN [id number]   Location 1818 [facility] Attending Itzel Rodgers MD   Hosp Day # 6 PCP No primary care provider on

## 2018-10-02 NOTE — PROGRESS NOTES
Antepartum Progress Note    No complaints. Denies contractions, pain. No vaginal bleeding, no LOF. NL FM.   Temp:  [98.2 °F (36.8 °C)-98.7 °F (37.1 °C)] 98.2 °F (36.8 °C)  Pulse:  [67-97] 71  Resp:  [16-18] 16  BP: (122-136)/(58-72) 127/68  Abd: soft, NT, g

## 2018-10-02 NOTE — PAYOR COMM NOTE
--------------  CONTINUED STAY REVIEW    Payor: Magda ESPOSITO/EDDIE  Subscriber #:  [id number]  Authorization Number: [id number]    Admit date: 9/26/18  Admit time: 1027    Admitting Physician: Don Haile MD  Attending Physician:  Kendy Cohen 10/2/2018 0854 Given 100 mg Oral Catrachito Jacinto RN    10/1/2018 2035 Given 100 mg Oral Ni Trejo RN      Progesterone Micronized (PROMETRIUM) cap 200 mg     Date Action Dose Route User    10/1/2018 2035 Given 200 mg Vaginal Ni Trejo RN

## 2018-10-03 PROCEDURE — 85025 COMPLETE CBC W/AUTO DIFF WBC: CPT | Performed by: OBSTETRICS & GYNECOLOGY

## 2018-10-03 NOTE — PROGRESS NOTES
OB/GYN: Antepartum Progress Note     SUBJECTIVE:  Interval History:   Fetal Movement: good  Vaginal Bleeding: none  Contractions: none  Leakage of Fluid: none  No c/o feels well  OBJECTIVE:  Vital signs in last 24 hours:  Temp:  [98.3 °F (36.8 °C)-99 °F

## 2018-10-03 NOTE — CONSULTS
[facility]    Maternal Fetal Medicine Progress Note    Thu Jett Patient Status:  Inpatient    1980 MRN [id number]   Location 1818 [facility] Attending Davy Sutton MD   Hosp Day # 7 PCP No primary care provider on

## 2018-10-03 NOTE — PLAN OF CARE
ANTEPARTUM/LABOR and DELIVERY    • Maintain pregnancy as long as maternal and/or fetal condition is stable Completed    • Anxiety is at manageable level Completed    • Demonstrates ability to cope with hospitalization/illness Completed        Patient/Famil

## 2018-10-04 NOTE — PAYOR COMM NOTE
--------------  CONTINUED STAY REVIEW    Payor: Lara MONSON  Subscriber #:  [id number]  Authorization Number: [id number]    Admit date: 9/26/18  Admit time: 1027    Admitting Physician: Karen Oshea MD  Attending Physician:  Juanito Baxter

## 2018-10-04 NOTE — PROGRESS NOTES
Antepartum Progress Note    No complaints. Denies vaginal bleeding, LOF, contractions/cramping.   Temp:  [97.8 °F (36.6 °C)-98.4 °F (36.9 °C)] 98.2 °F (36.8 °C)  Pulse:  [69-84] 74  Resp:  [16-20] 16  BP: (128-142)/(65-80) 128/65  FHT:  baseline 140s  Arley:

## 2018-10-04 NOTE — CONSULTS
[facility]    Maternal Fetal Medicine Progress Note    Thu Jett Patient Status:  Inpatient    1980 MRN [id number]   Location 1818 [facility] Attending Davy Sutton MD   Hosp Day # 8 PCP No primary care provider on

## 2018-10-05 NOTE — CONSULTS
[facility]    Maternal Fetal Medicine Progress Note    Calin Araiza Patient Status:  Inpatient    1980 MRN [id number]   Location 1818 [facility] Attending Ivonne Robert MD   Hosp Day # 9 PCP No primary care provider on

## 2018-10-05 NOTE — PAYOR COMM NOTE
--------------  CONTINUED STAY REVIEW    Payor: Magda ESPOSITO/EDDIE  Subscriber #:  [id number]  Authorization Number: [id number]    Admit date: 9/26/18  Admit time: 1027    Admitting Physician: Don Haile MD  Attending Physician:  Kendy Cohen

## 2018-10-05 NOTE — PROGRESS NOTES
SUBJECTIVE:   pt without complaints. No contractions. no abdominal pain. Good fetal movement. no vaginal discharge    OBJECTIVE:  VS:  height is 5' 2\" (1.575 m) and weight is 180 lb (81.6 kg). Her oral temperature is 98.5 °F (36.9 °C).  Her blood pressure

## 2018-10-06 PROCEDURE — 85025 COMPLETE CBC W/AUTO DIFF WBC: CPT | Performed by: OBSTETRICS & GYNECOLOGY

## 2018-10-06 NOTE — PROGRESS NOTES
OB/GYN: Antepartum Progress Note     SUBJECTIVE:  Nothing new  Interval History:   Fetal Movement: good  Vaginal Bleeding: none  Contractions: none  Leakage of Fluid: none  No c/o  OBJECTIVE:  Vital signs in last 24 hours:  Temp:  [98 °F (36.7 °C)-98.7 °

## 2018-10-06 NOTE — CONSULTS
[facility]    Maternal Fetal Medicine Progress Note    Nazia Cutler Patient Status:  Inpatient    1980 MRN [id number]   Location 1818 [facility] Attending Juan Savage MD   Hosp Day # 10 PCP No primary care provider o

## 2018-10-07 NOTE — PROGRESS NOTES
OB/GYN: Antepartum Progress Note     SUBJECTIVE:  Interval History: Nothing new  Fetal Movement: good  Vaginal Bleeding: none  Contractions: none  Leakage of Fluid: none    OBJECTIVE:  Vital signs in last 24 hours:  Temp:  [98 °F (36.7 °C)-98.2 °F (36.8

## 2018-10-08 VITALS
BODY MASS INDEX: 33.13 KG/M2 | TEMPERATURE: 98 F | SYSTOLIC BLOOD PRESSURE: 127 MMHG | WEIGHT: 180 LBS | HEART RATE: 77 BPM | RESPIRATION RATE: 16 BRPM | OXYGEN SATURATION: 99 % | DIASTOLIC BLOOD PRESSURE: 72 MMHG | HEIGHT: 62 IN

## 2018-10-08 PROCEDURE — 90471 IMMUNIZATION ADMIN: CPT

## 2018-10-08 PROCEDURE — 76817 TRANSVAGINAL US OBSTETRIC: CPT | Performed by: OBSTETRICS & GYNECOLOGY

## 2018-10-08 RX ORDER — ASPIRIN 81 MG/1
81 TABLET, CHEWABLE ORAL DAILY
Qty: 90 TABLET | Refills: 0 | Status: SHIPPED | OUTPATIENT
Start: 2018-10-09 | End: 2019-01-02

## 2018-10-08 NOTE — IMAGING NOTE
Indication: Cerclage, PTL.   ____________________________________________________________________________  History: Age: 34 years. Maternal age at [facility]: 34 years. : 3 Para: 2.  Last menstrual period: 2018.  ______________________________________

## 2018-10-08 NOTE — PAYOR COMM NOTE
--------------  CONTINUED STAY REVIEW    Payor: Federica ESPOSITO/EDDIE  Subscriber #:  [id number]  Authorization Number: [id number]    Admit date: 9/26/18  Admit time: 1027    Admitting Physician: Akshat Cox MD  Attending Physician:  Kassidy Woo cerclage   · AMA  · IVF     RECOMENDATIONS:   · Continue vaginal PG  · I agree with D/C home   · F/u in 1 wks for CL    Pt to be discharged today, 10/8/18.

## 2018-10-08 NOTE — PROGRESS NOTES
Pt complaints: none. Active FM. No bleeding or pain.      /87 (BP Location: Right arm)   Pulse 90   Temp 98.3 °F (36.8 °C) (Oral)   Resp 16   Ht 5' 2\" (1.575 m)   Wt 180 lb (81.6 kg)   SpO2 99%   BMI 32.92 kg/m²     Abdomen: gravid, nontender  Cervix

## 2018-10-08 NOTE — CONSULTS
[facility]    Maternal Fetal Medicine Progress Note    David Page Patient Status:  Inpatient    1980 MRN [id number]   Location [address] Attending Akshat Cox MD   Hosp Day # 12 PCP No primary care provider o moshe Li D.O.  10/8/2018  1:40 PM

## 2018-10-15 ENCOUNTER — OFFICE VISIT (OUTPATIENT)
Dept: PERINATAL CARE | Facility: HOSPITAL | Age: 38
End: 2018-10-15
Attending: OBSTETRICS & GYNECOLOGY
Payer: COMMERCIAL

## 2018-10-15 VITALS
HEART RATE: 77 BPM | DIASTOLIC BLOOD PRESSURE: 81 MMHG | BODY MASS INDEX: 33 KG/M2 | SYSTOLIC BLOOD PRESSURE: 137 MMHG | WEIGHT: 182 LBS

## 2018-10-15 DIAGNOSIS — O34.32 INCOMPETENT CERVIX DURING SECOND TRIMESTER, ANTEPARTUM: ICD-10-CM

## 2018-10-15 DIAGNOSIS — O09.522 ELDERLY MULTIGRAVIDA IN SECOND TRIMESTER: ICD-10-CM

## 2018-10-15 DIAGNOSIS — O09.899 HISTORY OF PRETERM DELIVERY, CURRENTLY PREGNANT: ICD-10-CM

## 2018-10-15 PROCEDURE — 99213 OFFICE O/P EST LOW 20 MIN: CPT | Performed by: OBSTETRICS & GYNECOLOGY

## 2018-10-15 PROCEDURE — 76817 TRANSVAGINAL US OBSTETRIC: CPT | Performed by: OBSTETRICS & GYNECOLOGY

## 2018-10-15 NOTE — PROGRESS NOTES
Pt here for cervical length  States +FM  No complaints of bleeding or spotting no increase in discharge.   Does feel increase pressure when standing

## 2018-10-15 NOTE — PROGRESS NOTES
Indication: cerclage x2, hx ptdx2, IVF. Maternal age (34 years). ____________________________________________________________________________  History: Age: 34 years. Maternal age at [facility] 37: 34 years. : 3 Para: 2.  Last menstrual period:  questions or concerns. Total patient time was  15 minutes in evaluation, consultation, and coordination of care. Greater than 50% of this time was spent in face to face discussion with the patient.

## 2018-10-18 NOTE — DISCHARGE SUMMARY
Reason for Admission: pregnancy with incompetent cervix    Hospital Course:  Pt had a prior cervical cerclage. She was found on ultrasound to have funneling and she was admitted for bedrest and underwent a rescue (second) cerclage.  She was continued on bed

## 2018-10-28 NOTE — PROGRESS NOTES
Lorelei Hinojosa  Dear Dr. Nicole Godoy,     Thank you for requesting ultrasound evaluation and maternal fetal medicine consultation on your patient Chris Camacho.   As you are aware she is a 34year old female  with a Si 28w0d  2.   Incompetent cervix - s/p cerclage x 2; second cerclage is holding.     DISCUSSION  During her visit we discussed and reviewed the following issues:  Advanced maternal age  We reviewed the risks, both maternal and fetal, of advancing maternal age

## 2018-10-31 ENCOUNTER — LAB ENCOUNTER (OUTPATIENT)
Dept: LAB | Facility: HOSPITAL | Age: 38
End: 2018-10-31
Attending: OBSTETRICS & GYNECOLOGY
Payer: COMMERCIAL

## 2018-10-31 ENCOUNTER — OFFICE VISIT (OUTPATIENT)
Dept: PERINATAL CARE | Facility: HOSPITAL | Age: 38
End: 2018-10-31
Attending: OBSTETRICS & GYNECOLOGY
Payer: COMMERCIAL

## 2018-10-31 VITALS
DIASTOLIC BLOOD PRESSURE: 80 MMHG | BODY MASS INDEX: 34 KG/M2 | WEIGHT: 186 LBS | HEART RATE: 80 BPM | SYSTOLIC BLOOD PRESSURE: 132 MMHG

## 2018-10-31 DIAGNOSIS — O34.32 INCOMPETENT CERVIX DURING SECOND TRIMESTER, ANTEPARTUM: ICD-10-CM

## 2018-10-31 DIAGNOSIS — Z36.9 ENCOUNTER FOR ANTENATAL SCREENING OF MOTHER: ICD-10-CM

## 2018-10-31 DIAGNOSIS — O34.33 CERVICAL INCOMPETENCE DURING PREGNANCY IN THIRD TRIMESTER: ICD-10-CM

## 2018-10-31 DIAGNOSIS — O09.523 ELDERLY MULTIGRAVIDA IN THIRD TRIMESTER: ICD-10-CM

## 2018-10-31 PROCEDURE — 76817 TRANSVAGINAL US OBSTETRIC: CPT | Performed by: OBSTETRICS & GYNECOLOGY

## 2018-10-31 PROCEDURE — 36415 COLL VENOUS BLD VENIPUNCTURE: CPT

## 2018-10-31 PROCEDURE — 85018 HEMOGLOBIN: CPT

## 2018-10-31 PROCEDURE — 82950 GLUCOSE TEST: CPT

## 2018-10-31 PROCEDURE — 85014 HEMATOCRIT: CPT

## 2018-10-31 PROCEDURE — 87389 HIV-1 AG W/HIV-1&-2 AB AG IA: CPT

## 2018-10-31 PROCEDURE — 99213 OFFICE O/P EST LOW 20 MIN: CPT | Performed by: OBSTETRICS & GYNECOLOGY

## 2018-10-31 PROCEDURE — 86780 TREPONEMA PALLIDUM: CPT

## 2018-11-04 ENCOUNTER — HOSPITAL ENCOUNTER (OUTPATIENT)
Facility: HOSPITAL | Age: 38
Setting detail: OBSERVATION
Discharge: HOME | End: 2018-11-04
Attending: OBSTETRICS & GYNECOLOGY | Admitting: OBSTETRICS & GYNECOLOGY
Payer: COMMERCIAL

## 2018-11-04 VITALS
SYSTOLIC BLOOD PRESSURE: 119 MMHG | TEMPERATURE: 98 F | BODY MASS INDEX: 34.04 KG/M2 | DIASTOLIC BLOOD PRESSURE: 66 MMHG | HEIGHT: 62 IN | WEIGHT: 185 LBS | HEART RATE: 94 BPM | RESPIRATION RATE: 16 BRPM

## 2018-11-04 PROCEDURE — 82731 ASSAY OF FETAL FIBRONECTIN: CPT | Performed by: OBSTETRICS & GYNECOLOGY

## 2018-11-04 PROCEDURE — 81002 URINALYSIS NONAUTO W/O SCOPE: CPT

## 2018-11-04 PROCEDURE — 99214 OFFICE O/P EST MOD 30 MIN: CPT

## 2018-11-04 NOTE — PROGRESS NOTES
Pt here with complaints of increased amount of pelvic pressure since 0600 today that comes and goes, but is not any worse at this time than it was in the morning. Pt has a cerclage in and was worried due to history of 2 early losses.   Denies vaginal bleedi

## 2018-11-04 NOTE — NST
Nonstress Test   Patient: Topher Jin    Gestation: 28w4d    NST:       Variability: Moderate           Accelerations: Yes           Decelerations: Variable            Baseline: 140 BPM           Uterine Irritability: Yes                       Minut

## 2018-11-04 NOTE — PROGRESS NOTES
Pt given verbal and written discharge instructions and verbalized understanding of them. Dr Elle Murdock in call room and reviwed fhr tracing. Pt discharged home ambulatory to call office in the morning for a follow up appt tomorrow or Tuesday.   Will return if incre

## 2018-11-04 NOTE — PROGRESS NOTES
Triage Note    Pt with incompetent cervix, status post cerclage   Temp:  [98.2 °F (36.8 °C)] 98.2 °F (36.8 °C)  Pulse:  [94] 94  Resp:  [16] 16  BP: (119)/(66) 119/66  FHT:  baseline ***s, *** variability, accels appreciated, ***  [place]:  contractions q***

## 2018-11-05 NOTE — PROGRESS NOTES
Triage Note    Pt with incompetent cervix, status post cerclage. Reports increased pelvic pressure and cramping today since 6am, unchanged over the day.  Improved with rest.  Temp:  [98.2 °F (36.8 °C)] 98.2 °F (36.8 °C)  Pulse:  [94] 94  Resp:  [16] 16  BP

## 2018-11-21 NOTE — PROGRESS NOTES
Maribel Hernandez  Dear Dr. Melissa Ladd,     Thank you for requesting ultrasound evaluation and maternal fetal medicine consultation on your patient Cecelia Barnett.  As you are aware she is a 34year old female  with a Si 34w5d)  .4 mm 38th% 31w3d  HUM 55.5 mm 57th% 32w4d  VENTRp 4.8 mm n/a  HC/AC Ratio 1.041  38th%  FL/AC Ratio 0.213  n/a  BPD/FL Ratio 1.328  20th%  HC/FL Ratio 4.887  45th%  EFW (lbs/oz) 4 lbs 6 ozs  EFW (g) 1985 g  54th%     Fetal heart activity: p mortality and  delivery. We discussed the plan of care and the rationale for the increased surveillance. We reviewed the signs and symptoms of preeclampsia,  labor and monitoring fetal movement.   We discussed reasons for her to call her ph

## 2018-11-23 ENCOUNTER — TELEPHONE (OUTPATIENT)
Dept: OBGYN UNIT | Facility: HOSPITAL | Age: 38
End: 2018-11-23

## 2018-11-23 ENCOUNTER — HOSPITAL ENCOUNTER (OUTPATIENT)
Facility: HOSPITAL | Age: 38
Setting detail: OBSERVATION
Discharge: HOME | End: 2018-11-23
Attending: OBSTETRICS & GYNECOLOGY | Admitting: OBSTETRICS & GYNECOLOGY
Payer: COMMERCIAL

## 2018-11-23 VITALS
TEMPERATURE: 98 F | BODY MASS INDEX: 34.41 KG/M2 | SYSTOLIC BLOOD PRESSURE: 137 MMHG | WEIGHT: 187 LBS | HEART RATE: 80 BPM | RESPIRATION RATE: 16 BRPM | DIASTOLIC BLOOD PRESSURE: 82 MMHG | HEIGHT: 62 IN

## 2018-11-23 PROCEDURE — 76815 OB US LIMITED FETUS(S): CPT | Performed by: OBSTETRICS & GYNECOLOGY

## 2018-11-23 PROCEDURE — 99214 OFFICE O/P EST MOD 30 MIN: CPT

## 2018-11-23 PROCEDURE — 76817 TRANSVAGINAL US OBSTETRIC: CPT | Performed by: OBSTETRICS & GYNECOLOGY

## 2018-11-23 PROCEDURE — 81002 URINALYSIS NONAUTO W/O SCOPE: CPT

## 2018-11-23 NOTE — PROGRESS NOTES
31.2 wks admitted to triage 3 with complaints of pain after using the bathroom. PT states that she does not have any pain at while laying in bed. PT appears comfortable. Pt states that she has what feels like post-op pain in her cervix.  PT states her

## 2018-11-23 NOTE — IMAGING NOTE
Indication: Cervical pain, h/o cerclage. ____________________________________________________________________________  History: Age: 34 years. Maternal age at [facility]: 34 years. : 3 Para: 2.  Last menstrual period: 2018.  ________________________

## 2018-11-23 NOTE — CONSULTS
[facility]  Maternal-Fetal Medicine Inpatient Consultation    Date of Admission:  11/23/2018  Date of Consult:  11/23/2018    Reason for Consult:   Alba Pierre Part vaginal pain, history of incompetent cervix with cervical cervical cerclage is in place. MD at [address] [place] [address] L+D OR   • OTHER SURGICAL HISTORY  04/2018    uterine polypectomy    • OTHER SURGICAL HISTORY  1998    breast augmentation      Family History   Problem Relation Age of Onset   • High Blood Pressure Mother    • High Cholesterol Mother    • Diabete leaking amniotic fluid. In addition if she has any concern about fetal well-being such as decreased fetal movement and she should also call and return. Her questions were answered to her satisfaction. IMPRESSION:   1.  IUP at 31w2d  2.   Incompetent

## 2018-11-23 NOTE — NST
Nonstress Test   Patient: Jennifer Arana    Gestation: 31w2d    NST:       Variability: Moderate           Accelerations: Yes           Decelerations: None            Baseline: 135 BPM           Uterine Irritability: No           Contractions: Not pres

## 2018-11-23 NOTE — PROGRESS NOTES
d Hospital  Labor and Delivery Prenatal History and Physical Interval Addendum/Triage assessment  Please see full Prenatal Record for this pregnancy      SUBJECTIVE:    Interval History:      This is a pregnancy at 31 weeks who presents to Labor and Deliver

## 2018-11-23 NOTE — PROGRESS NOTES
Discharge instructions discussed with pt, and given to pt, pt verb understanding. Pt d/c to home stable and undelivered, out of unit via w/c with PCT.

## 2018-11-23 NOTE — PROGRESS NOTES
Report rec'd from Batool Bangura, RN and care assumed. POC care reviewed with pt, pt verb understanding. States she feels like a \"ripping\" pain in her cervix after she uses BR to urinate. Denies any vaginal bleeding.  She Denies feeling any uterine cramping, states

## 2018-11-28 ENCOUNTER — OFFICE VISIT (OUTPATIENT)
Dept: PERINATAL CARE | Facility: HOSPITAL | Age: 38
End: 2018-11-28
Attending: OBSTETRICS & GYNECOLOGY
Payer: COMMERCIAL

## 2018-11-28 VITALS
HEART RATE: 78 BPM | BODY MASS INDEX: 35 KG/M2 | SYSTOLIC BLOOD PRESSURE: 139 MMHG | DIASTOLIC BLOOD PRESSURE: 82 MMHG | WEIGHT: 192 LBS

## 2018-11-28 DIAGNOSIS — O34.32 INCOMPETENT CERVIX DURING SECOND TRIMESTER, ANTEPARTUM: ICD-10-CM

## 2018-11-28 DIAGNOSIS — O09.523 MULTIGRAVIDA OF ADVANCED MATERNAL AGE IN THIRD TRIMESTER: ICD-10-CM

## 2018-11-28 DIAGNOSIS — O09.899 HISTORY OF PRETERM DELIVERY, CURRENTLY PREGNANT: ICD-10-CM

## 2018-11-28 PROCEDURE — 99214 OFFICE O/P EST MOD 30 MIN: CPT | Performed by: OBSTETRICS & GYNECOLOGY

## 2018-11-28 PROCEDURE — 76805 OB US >/= 14 WKS SNGL FETUS: CPT | Performed by: OBSTETRICS & GYNECOLOGY

## 2018-11-28 PROCEDURE — 76819 FETAL BIOPHYS PROFIL W/O NST: CPT | Performed by: OBSTETRICS & GYNECOLOGY

## 2018-12-18 ENCOUNTER — HOSPITAL ENCOUNTER (OUTPATIENT)
Facility: HOSPITAL | Age: 38
Setting detail: OBSERVATION
Discharge: HOME | End: 2018-12-19
Attending: OBSTETRICS & GYNECOLOGY | Admitting: OBSTETRICS & GYNECOLOGY
Payer: COMMERCIAL

## 2018-12-19 VITALS
HEART RATE: 86 BPM | RESPIRATION RATE: 22 BRPM | DIASTOLIC BLOOD PRESSURE: 82 MMHG | SYSTOLIC BLOOD PRESSURE: 148 MMHG | TEMPERATURE: 97 F

## 2018-12-19 PROBLEM — B00.9 HSV INFECTION: Status: ACTIVE | Noted: 2018-12-19

## 2018-12-19 PROBLEM — Z34.90 NORMAL PREGNANCY: Status: ACTIVE | Noted: 2018-12-19

## 2018-12-19 PROCEDURE — 81002 URINALYSIS NONAUTO W/O SCOPE: CPT

## 2018-12-19 PROCEDURE — 96372 THER/PROPH/DIAG INJ SC/IM: CPT

## 2018-12-19 PROCEDURE — 99213 OFFICE O/P EST LOW 20 MIN: CPT

## 2018-12-19 PROCEDURE — 96361 HYDRATE IV INFUSION ADD-ON: CPT

## 2018-12-19 PROCEDURE — 87081 CULTURE SCREEN ONLY: CPT | Performed by: OBSTETRICS & GYNECOLOGY

## 2018-12-19 PROCEDURE — 87653 STREP B DNA AMP PROBE: CPT | Performed by: OBSTETRICS & GYNECOLOGY

## 2018-12-19 PROCEDURE — 96360 HYDRATION IV INFUSION INIT: CPT

## 2018-12-19 RX ORDER — BETAMETHASONE SODIUM PHOSPHATE AND BETAMETHASONE ACETATE 3; 3 MG/ML; MG/ML
12 INJECTION, SUSPENSION INTRA-ARTICULAR; INTRALESIONAL; INTRAMUSCULAR; SOFT TISSUE EVERY 24 HOURS
Status: COMPLETED | OUTPATIENT
Start: 2018-12-19 | End: 2018-12-19

## 2018-12-19 RX ORDER — ACYCLOVIR 400 MG/1
400 TABLET ORAL 2 TIMES DAILY
Status: DISCONTINUED | OUTPATIENT
Start: 2018-12-19 | End: 2018-12-20

## 2018-12-19 RX ORDER — SODIUM CHLORIDE, SODIUM LACTATE, POTASSIUM CHLORIDE, CALCIUM CHLORIDE 600; 310; 30; 20 MG/100ML; MG/100ML; MG/100ML; MG/100ML
INJECTION, SOLUTION INTRAVENOUS CONTINUOUS
Status: DISCONTINUED | OUTPATIENT
Start: 2018-12-19 | End: 2018-12-20

## 2018-12-19 RX ORDER — ACYCLOVIR 400 MG/1
400 TABLET ORAL 2 TIMES DAILY
Qty: 60 TABLET | Refills: 0 | Status: ON HOLD | OUTPATIENT
Start: 2018-12-19 | End: 2019-01-11

## 2018-12-19 NOTE — H&P
[address] and Delivery Prenatal History and Physical Interval Addendum  Please see full Prenatal Record for this pregnancy      SUBJECTIVE:    Interval History:      This is a pregnancy at 35 weeks admitted for  UC with cerclage in place

## 2018-12-19 NOTE — PROGRESS NOTES
Pt is a 34year old female admitted to TRG3/TRG3-A, Patient presents with:  Contractions     Patient reports having intermittent contractions since Thursday evening of last week 12/13/18 that resolved by the morning.  Patient reports having moderately painf

## 2018-12-19 NOTE — PROGRESS NOTES
Antepartum Progress Note    NL FM. Mild contractions, more at night, less during the day. No vaginal bleeding, no LOF.   Temp:  [97 °F (36.1 °C)-98.1 °F (36.7 °C)] 97 °F (36.1 °C)  Pulse:  [68-85] 73  Resp:  [18-20] 18  BP: (135-140)/(76-80) 137/76  FHT:  b

## 2018-12-20 NOTE — PROGRESS NOTES
Pt tolerated steroid dose administered at 2300 per Dr Thierno Batres in right ventrogluteal area. DC instructions and prescription given. Denies pain, cramping, loss of fluid or bleeding at this time. Pt wishes to walk to vehicle, declines WC.  Call MD to schedule a

## 2018-12-26 ENCOUNTER — HOSPITAL ENCOUNTER (OUTPATIENT)
Facility: HOSPITAL | Age: 38
Setting detail: OBSERVATION
Discharge: HOME | End: 2018-12-26
Attending: OBSTETRICS & GYNECOLOGY | Admitting: OBSTETRICS & GYNECOLOGY
Payer: COMMERCIAL

## 2018-12-26 PROCEDURE — 99213 OFFICE O/P EST LOW 20 MIN: CPT

## 2018-12-26 PROCEDURE — 0UCC7ZZ EXTIRPATION OF MATTER FROM CERVIX, VIA NATURAL OR ARTIFICIAL OPENING: ICD-10-PCS | Performed by: OBSTETRICS & GYNECOLOGY

## 2018-12-26 NOTE — PROGRESS NOTES
Cerclage x2 removed completely and intact on L&D    Cx 1/50/-2    Discharged home in stable condition with labor precautions.

## 2019-01-08 ENCOUNTER — HOSPITAL ENCOUNTER (INPATIENT)
Facility: HOSPITAL | Age: 39
LOS: 3 days | Discharge: HOME | End: 2019-01-11
Attending: OBSTETRICS & GYNECOLOGY | Admitting: OBSTETRICS & GYNECOLOGY
Payer: COMMERCIAL

## 2019-01-08 LAB
ALBUMIN SERPL-MCNC: 2.8 G/DL (ref 3.1–4.5)
ALBUMIN/GLOB SERPL: 0.7 {RATIO} (ref 1–2)
ALP LIVER SERPL-CCNC: 195 U/L (ref 37–98)
ALT SERPL-CCNC: 41 U/L (ref 14–54)
ANION GAP SERPL CALC-SCNC: 8 MMOL/L (ref 0–18)
ANTIBODY SCREEN: NEGATIVE
AST SERPL-CCNC: 36 U/L (ref 15–41)
BASOPHILS # BLD AUTO: 0.01 X10(3) UL (ref 0–0.1)
BASOPHILS NFR BLD AUTO: 0.1 %
BILIRUB SERPL-MCNC: 0.5 MG/DL (ref 0.1–2)
BILIRUBIN URINE: NEGATIVE
BLOOD URINE: NEGATIVE
BUN BLD-MCNC: 11 MG/DL (ref 8–20)
BUN/CREAT SERPL: 15.9 (ref 10–20)
CALCIUM BLD-MCNC: 9.7 MG/DL (ref 8.3–10.3)
CHLORIDE SERPL-SCNC: 108 MMOL/L (ref 101–111)
CO2 SERPL-SCNC: 21 MMOL/L (ref 22–32)
CONTROL RUN WITHIN 24 HOURS?: YES
CREAT BLD-MCNC: 0.69 MG/DL (ref 0.55–1.02)
CREAT UR-SCNC: <13 MG/DL
EOSINOPHIL # BLD AUTO: 0.14 X10(3) UL (ref 0–0.3)
EOSINOPHIL NFR BLD AUTO: 1.8 %
ERYTHROCYTE [DISTWIDTH] IN BLOOD BY AUTOMATED COUNT: 14.5 % (ref 11.5–16)
GLOBULIN PLAS-MCNC: 4.3 G/DL (ref 2.8–4.4)
GLUCOSE BLD-MCNC: 88 MG/DL (ref 70–99)
GLUCOSE URINE: NEGATIVE
HCT VFR BLD AUTO: 36.9 % (ref 34–50)
HGB BLD-MCNC: 13 G/DL (ref 12–16)
IMMATURE GRANULOCYTE COUNT: 0.05 X10(3) UL (ref 0–1)
IMMATURE GRANULOCYTE RATIO %: 0.7 %
KETONE URINE: NEGATIVE
LEUKOCYTE ESTERASE URINE: NEGATIVE
LYMPHOCYTES # BLD AUTO: 1.82 X10(3) UL (ref 0.9–4)
LYMPHOCYTES NFR BLD AUTO: 23.9 %
M PROTEIN MFR SERPL ELPH: 7.1 G/DL (ref 6.4–8.2)
MCH RBC QN AUTO: 30 PG (ref 27–33.2)
MCHC RBC AUTO-ENTMCNC: 35.2 G/DL (ref 31–37)
MCV RBC AUTO: 85 FL (ref 81–100)
MONOCYTES # BLD AUTO: 0.72 X10(3) UL (ref 0.1–1)
MONOCYTES NFR BLD AUTO: 9.4 %
NEUTROPHIL ABS PRELIM: 4.88 X10 (3) UL (ref 1.3–6.7)
NEUTROPHILS # BLD AUTO: 4.88 X10(3) UL (ref 1.3–6.7)
NEUTROPHILS NFR BLD AUTO: 64.1 %
NITRITE URINE: NEGATIVE
OSMOLALITY SERPL CALC.SUM OF ELEC: 283 MOSM/KG (ref 275–295)
PH URINE: 6.5 (ref 5–8)
PLATELET # BLD AUTO: 250 10(3)UL (ref 150–450)
POTASSIUM SERPL-SCNC: 4.1 MMOL/L (ref 3.6–5.1)
PROT UR-MCNC: <5 MG/DL
PROTEIN URINE: NEGATIVE
RBC # BLD AUTO: 4.34 X10(6)UL (ref 3.8–5.1)
RED CELL DISTRIBUTION WIDTH-SD: 44 FL (ref 35.1–46.3)
RH BLOOD TYPE: POSITIVE
SODIUM SERPL-SCNC: 137 MMOL/L (ref 136–144)
SPEC GRAVITY: 1.01 (ref 1–1.03)
T PALLIDUM AB SER QL IA: NONREACTIVE
URATE SERPL-MCNC: 5.2 MG/DL (ref 2.4–8)
URINE CLARITY: CLEAR
UROBILINOGEN URINE: 0.2
WBC # BLD AUTO: 7.6 X10(3) UL (ref 4–13)

## 2019-01-08 PROCEDURE — 85025 COMPLETE CBC W/AUTO DIFF WBC: CPT | Performed by: OBSTETRICS & GYNECOLOGY

## 2019-01-08 PROCEDURE — 10907ZC DRAINAGE OF AMNIOTIC FLUID, THERAPEUTIC FROM PRODUCTS OF CONCEPTION, VIA NATURAL OR ARTIFICIAL OPENING: ICD-10-PCS | Performed by: OBSTETRICS & GYNECOLOGY

## 2019-01-08 PROCEDURE — 84550 ASSAY OF BLOOD/URIC ACID: CPT | Performed by: OBSTETRICS & GYNECOLOGY

## 2019-01-08 PROCEDURE — 81002 URINALYSIS NONAUTO W/O SCOPE: CPT

## 2019-01-08 PROCEDURE — 86901 BLOOD TYPING SEROLOGIC RH(D): CPT | Performed by: OBSTETRICS & GYNECOLOGY

## 2019-01-08 PROCEDURE — 86900 BLOOD TYPING SEROLOGIC ABO: CPT | Performed by: OBSTETRICS & GYNECOLOGY

## 2019-01-08 PROCEDURE — 99214 OFFICE O/P EST MOD 30 MIN: CPT

## 2019-01-08 PROCEDURE — 82570 ASSAY OF URINE CREATININE: CPT | Performed by: OBSTETRICS & GYNECOLOGY

## 2019-01-08 PROCEDURE — 80053 COMPREHEN METABOLIC PANEL: CPT | Performed by: OBSTETRICS & GYNECOLOGY

## 2019-01-08 PROCEDURE — 86850 RBC ANTIBODY SCREEN: CPT | Performed by: OBSTETRICS & GYNECOLOGY

## 2019-01-08 PROCEDURE — 84156 ASSAY OF PROTEIN URINE: CPT | Performed by: OBSTETRICS & GYNECOLOGY

## 2019-01-08 PROCEDURE — 3E033VJ INTRODUCTION OF OTHER HORMONE INTO PERIPHERAL VEIN, PERCUTANEOUS APPROACH: ICD-10-PCS | Performed by: OBSTETRICS & GYNECOLOGY

## 2019-01-08 PROCEDURE — 86780 TREPONEMA PALLIDUM: CPT | Performed by: OBSTETRICS & GYNECOLOGY

## 2019-01-08 PROCEDURE — 3E0P7VZ INTRODUCTION OF HORMONE INTO FEMALE REPRODUCTIVE, VIA NATURAL OR ARTIFICIAL OPENING: ICD-10-PCS | Performed by: OBSTETRICS & GYNECOLOGY

## 2019-01-08 RX ORDER — ZOLPIDEM TARTRATE 5 MG/1
5 TABLET ORAL NIGHTLY PRN
Status: DISCONTINUED | OUTPATIENT
Start: 2019-01-08 | End: 2019-01-09

## 2019-01-08 RX ORDER — TERBUTALINE SULFATE 1 MG/ML
0.25 INJECTION, SOLUTION SUBCUTANEOUS AS NEEDED
Status: DISCONTINUED | OUTPATIENT
Start: 2019-01-08 | End: 2019-01-09

## 2019-01-08 RX ORDER — EPHEDRINE SULFATE/0.9% NACL/PF 25 MG/5 ML
5 SYRINGE (ML) INTRAVENOUS AS NEEDED
Status: DISCONTINUED | OUTPATIENT
Start: 2019-01-08 | End: 2019-01-09

## 2019-01-08 RX ORDER — NALBUPHINE HCL 10 MG/ML
2.5 AMPUL (ML) INJECTION
Status: DISCONTINUED | OUTPATIENT
Start: 2019-01-08 | End: 2019-01-09

## 2019-01-08 RX ORDER — TRISODIUM CITRATE DIHYDRATE AND CITRIC ACID MONOHYDRATE 500; 334 MG/5ML; MG/5ML
30 SOLUTION ORAL AS NEEDED
Status: DISCONTINUED | OUTPATIENT
Start: 2019-01-08 | End: 2019-01-09

## 2019-01-08 RX ORDER — IBUPROFEN 600 MG/1
600 TABLET ORAL ONCE AS NEEDED
Status: DISCONTINUED | OUTPATIENT
Start: 2019-01-08 | End: 2019-01-09

## 2019-01-08 RX ORDER — DEXTROSE, SODIUM CHLORIDE, SODIUM LACTATE, POTASSIUM CHLORIDE, AND CALCIUM CHLORIDE 5; .6; .31; .03; .02 G/100ML; G/100ML; G/100ML; G/100ML; G/100ML
INJECTION, SOLUTION INTRAVENOUS AS NEEDED
Status: DISCONTINUED | OUTPATIENT
Start: 2019-01-08 | End: 2019-01-09

## 2019-01-08 RX ORDER — SODIUM CHLORIDE, SODIUM LACTATE, POTASSIUM CHLORIDE, CALCIUM CHLORIDE 600; 310; 30; 20 MG/100ML; MG/100ML; MG/100ML; MG/100ML
INJECTION, SOLUTION INTRAVENOUS CONTINUOUS
Status: DISCONTINUED | OUTPATIENT
Start: 2019-01-08 | End: 2019-01-09

## 2019-01-08 NOTE — PROGRESS NOTES
Comfortable w/o HA, feels occ cramp  Has been on Valtrex suppression, denies any symptoms of outbreak  BP 140s/80s  Plan cervidil out in afternoon depending on response

## 2019-01-08 NOTE — PROGRESS NOTES
Pt is a 34year old female admitted to 114/114-A. Patient presents with:  R/o Pih     Pt is  37w6d intra-uterine pregnancy. History obtained, consents signed. Oriented to room, staff, and plan of care.

## 2019-01-08 NOTE — H&P
OB H&P    37yo  at 37w6d admitted with elevated BP at term for labor induction. She noticed a headache last night and took her BP, which was elevated to the 150s/90s. She then presented to labor and delivery, where BPs were 130s-150s/70s-80s.   She Mother         type 2   • High Blood Pressure Father    • High Cholesterol Father    • Colon Cancer Maternal Grandmother 80        late onset    • Other (osteoporosis) Maternal Grandmother        Social History    Tobacco Use      Smoking status: Former Sm

## 2019-01-08 NOTE — PLAN OF CARE
Problem: Patient/Family Goals  Goal: Patient/Family Long Term Goal  Patient's Long Term Goal: Uncomplicated vaginal delivery    Interventions:  VS per protocol  I&O  Ice chips and sips as tolerated  EFM per protocol  Maintain IV as ordered  Antibiotics as levels  INTERVENTIONS:  - Administer medication as ordered  - Teach and rehearse alternative coping skills  - Provide emotional support with 1:1 interaction with staff  Outcome: Progressing

## 2019-01-09 ENCOUNTER — TELEPHONE (OUTPATIENT)
Dept: OBGYN UNIT | Facility: HOSPITAL | Age: 39
End: 2019-01-09

## 2019-01-09 RX ORDER — ONDANSETRON 2 MG/ML
INJECTION INTRAMUSCULAR; INTRAVENOUS
Status: COMPLETED
Start: 2019-01-09 | End: 2019-01-09

## 2019-01-09 RX ORDER — ACETAMINOPHEN 325 MG/1
650 TABLET ORAL EVERY 6 HOURS PRN
Status: DISCONTINUED | OUTPATIENT
Start: 2019-01-09 | End: 2019-01-11

## 2019-01-09 RX ORDER — BISACODYL 10 MG
10 SUPPOSITORY, RECTAL RECTAL ONCE AS NEEDED
Status: DISCONTINUED | OUTPATIENT
Start: 2019-01-09 | End: 2019-01-11

## 2019-01-09 RX ORDER — ENOXAPARIN SODIUM 100 MG/ML
40 INJECTION SUBCUTANEOUS DAILY
Status: DISCONTINUED | OUTPATIENT
Start: 2019-01-10 | End: 2019-01-11

## 2019-01-09 RX ORDER — ZOLPIDEM TARTRATE 5 MG/1
5 TABLET ORAL NIGHTLY PRN
Status: DISCONTINUED | OUTPATIENT
Start: 2019-01-09 | End: 2019-01-11

## 2019-01-09 RX ORDER — DOCUSATE SODIUM 100 MG/1
100 CAPSULE, LIQUID FILLED ORAL
Status: DISCONTINUED | OUTPATIENT
Start: 2019-01-09 | End: 2019-01-11

## 2019-01-09 RX ORDER — IBUPROFEN 600 MG/1
600 TABLET ORAL EVERY 6 HOURS
Status: DISCONTINUED | OUTPATIENT
Start: 2019-01-09 | End: 2019-01-11

## 2019-01-09 RX ORDER — ONDANSETRON 2 MG/ML
4 INJECTION INTRAMUSCULAR; INTRAVENOUS EVERY 6 HOURS PRN
Status: DISCONTINUED | OUTPATIENT
Start: 2019-01-09 | End: 2019-01-09

## 2019-01-09 RX ORDER — SIMETHICONE 80 MG
80 TABLET,CHEWABLE ORAL 3 TIMES DAILY PRN
Status: DISCONTINUED | OUTPATIENT
Start: 2019-01-09 | End: 2019-01-11

## 2019-01-09 NOTE — PROGRESS NOTES
Labor Progress Note    No complaints.   Temp:  [98.2 °F (36.8 °C)-98.6 °F (37 °C)] 98.2 °F (36.8 °C)  Pulse:  [62-82] 71  Resp:  [16-20] 18  BP: (131-174)/(73-91) 132/77  FHT:  baseline 120s, moderate variability, accels appreciated, no decels  [place]:  contr

## 2019-01-09 NOTE — L&D DELIVERY NOTE
Bernard Cheney  [[id number]]    Labor Events     labor?:  No   steroids?:  None  Antibiotics received during labor?:  No  Antibiotics (enter # doses in comment):  none  Rupture date/time:  2019 2224   Rupture type:  SROM  Fluid color: anesthesia. EBL-300cc.   No complications

## 2019-01-09 NOTE — PROGRESS NOTES
Labor Progress Note    No complaints.   Temp:  [98.2 °F (36.8 °C)-98.6 °F (37 °C)] 98.2 °F (36.8 °C)  Pulse:  [62-82] 71  Resp:  [16-20] 18  BP: (131-174)/(73-91) 132/77  FHT:  baseline 140s, moderate variability, accels appreciated, no decels  [place]:  occ c

## 2019-01-09 NOTE — PROGRESS NOTES
Labor Progress Note    No complaints.  Status post epidural.  Temp:  [98.2 °F (36.8 °C)-98.5 °F (36.9 °C)] 98.2 °F (36.8 °C)  Pulse:  [62-90] 65  Resp:  [16-20] 16  BP: (115-174)/(54-91) 130/61  FHT:  baseline 120s, moderate variability, accels appreciated,

## 2019-01-10 PROBLEM — O09.899 HISTORY OF PRETERM DELIVERY, CURRENTLY PREGNANT: Status: RESOLVED | Noted: 2018-06-20 | Resolved: 2019-01-10

## 2019-01-10 PROBLEM — O09.819 ENCOUNTER FOR SUPERVISION OF PREGNANCY RESULTING FROM ASSISTED REPRODUCTIVE TECHNOLOGY: Status: RESOLVED | Noted: 2018-06-20 | Resolved: 2019-01-10

## 2019-01-10 PROBLEM — O09.529 ADVANCED MATERNAL AGE IN MULTIGRAVIDA: Status: RESOLVED | Noted: 2018-06-20 | Resolved: 2019-01-10

## 2019-01-10 PROBLEM — O34.30 INCOMPETENT CERVIX IN PREGNANCY: Status: RESOLVED | Noted: 2018-06-27 | Resolved: 2019-01-10

## 2019-01-10 LAB
BASOPHILS # BLD AUTO: 0.04 X10(3) UL (ref 0–0.1)
BASOPHILS NFR BLD AUTO: 0.3 %
EOSINOPHIL # BLD AUTO: 0.12 X10(3) UL (ref 0–0.3)
EOSINOPHIL NFR BLD AUTO: 0.8 %
ERYTHROCYTE [DISTWIDTH] IN BLOOD BY AUTOMATED COUNT: 14.6 % (ref 11.5–16)
HCT VFR BLD AUTO: 33.1 % (ref 34–50)
HGB BLD-MCNC: 11 G/DL (ref 12–16)
IMMATURE GRANULOCYTE COUNT: 0.09 X10(3) UL (ref 0–1)
IMMATURE GRANULOCYTE RATIO %: 0.6 %
LYMPHOCYTES # BLD AUTO: 1.88 X10(3) UL (ref 0.9–4)
LYMPHOCYTES NFR BLD AUTO: 12.3 %
MCH RBC QN AUTO: 29.6 PG (ref 27–33.2)
MCHC RBC AUTO-ENTMCNC: 33.2 G/DL (ref 31–37)
MCV RBC AUTO: 89 FL (ref 81–100)
MONOCYTES # BLD AUTO: 1.08 X10(3) UL (ref 0.1–1)
MONOCYTES NFR BLD AUTO: 7.1 %
NEUTROPHIL ABS PRELIM: 12.02 X10 (3) UL (ref 1.3–6.7)
NEUTROPHILS # BLD AUTO: 12.02 X10(3) UL (ref 1.3–6.7)
NEUTROPHILS NFR BLD AUTO: 78.9 %
PLATELET # BLD AUTO: 222 10(3)UL (ref 150–450)
RBC # BLD AUTO: 3.72 X10(6)UL (ref 3.8–5.1)
RED CELL DISTRIBUTION WIDTH-SD: 46.7 FL (ref 35.1–46.3)
WBC # BLD AUTO: 15.2 X10(3) UL (ref 4–13)

## 2019-01-10 PROCEDURE — 85025 COMPLETE CBC W/AUTO DIFF WBC: CPT | Performed by: OBSTETRICS & GYNECOLOGY

## 2019-01-10 RX ORDER — LABETALOL 100 MG/1
100 TABLET, FILM COATED ORAL EVERY 12 HOURS SCHEDULED
Status: DISCONTINUED | OUTPATIENT
Start: 2019-01-10 | End: 2019-01-11

## 2019-01-10 NOTE — PROGRESS NOTES
Pt up to BR with assistance from this RN. Gait steady. Pt voids without difficulty. Bambi-bottle given. Dermoplast to Perineum. Pt up to [facility] without incident.

## 2019-01-10 NOTE — PROGRESS NOTES
Postpartum Day 1    Pt without complaints.     Temp:  [97.9 °F (36.6 °C)-99.6 °F (37.6 °C)] 97.9 °F (36.6 °C)  Pulse:  [] 82  Resp:  [17-20] 17  BP: (133-174)/() 152/79   Patient Vitals for the past 24 hrs:   BP Temp Temp src Pulse Resp   01/10/

## 2019-01-10 NOTE — PROGRESS NOTES
Up to br w RN, voided 400 cc, pericare done, sm rubra noted and easily back to bed. Baby in crib at bedside.

## 2019-01-11 VITALS
OXYGEN SATURATION: 99 % | DIASTOLIC BLOOD PRESSURE: 97 MMHG | BODY MASS INDEX: 36.07 KG/M2 | RESPIRATION RATE: 17 BRPM | HEIGHT: 62 IN | TEMPERATURE: 99 F | WEIGHT: 196 LBS | HEART RATE: 63 BPM | SYSTOLIC BLOOD PRESSURE: 148 MMHG

## 2019-01-11 RX ORDER — LABETALOL 100 MG/1
100 TABLET, FILM COATED ORAL EVERY 12 HOURS SCHEDULED
Qty: 60 TABLET | Refills: 1 | Status: SHIPPED | OUTPATIENT
Start: 2019-01-11 | End: 2021-03-22

## 2019-01-11 NOTE — PAYOR COMM NOTE
--------------  ADMISSION REVIEW     Payor: Elsa ESPOSITO/EDDIE  Subscriber #:  [id number]  Authorization Number: [id number]    Admit date: 1/8/19  Admit time: 8546       Admitting Physician: Yeimi Wyatt MD  Attending Physician:  Yeimi Wyatt MD  [facility] severe symptoms of pre-eclampsia  FWBR  Anticipate         2019  3:03 PM    Pt is a41 year old y/o, [id number] 38w0d . Normal spontaneous vaginal delivery of male infant. TOB-14:55, wt-6#6oz, Apgar-9,9 . No episiotomy with no laceration.  Placenta de

## 2019-01-11 NOTE — PROGRESS NOTES
Postpartum Day 2    Pt without complaints.     Temp:  [97.9 °F (36.6 °C)-98.5 °F (36.9 °C)] 98.5 °F (36.9 °C)  Pulse:  [] 112  Resp:  [17-18] 17  BP: (143)/(80) 143/80  abd  soft, NT, ND, fundus firm below umbilicus  perineum NL lochia  extr  trace ed

## 2019-01-14 ENCOUNTER — TELEPHONE (OUTPATIENT)
Dept: OBGYN UNIT | Facility: HOSPITAL | Age: 39
End: 2019-01-14

## 2019-01-16 ENCOUNTER — TELEPHONE (OUTPATIENT)
Dept: OBGYN UNIT | Facility: HOSPITAL | Age: 39
End: 2019-01-16

## 2019-01-29 PROBLEM — O34.32 INCOMPETENT CERVIX DURING SECOND TRIMESTER, ANTEPARTUM: Status: RESOLVED | Noted: 2018-09-26 | Resolved: 2019-01-29

## 2019-01-29 PROBLEM — Z34.90 PREGNANCY: Status: RESOLVED | Noted: 2018-07-11 | Resolved: 2019-01-29

## 2019-01-29 PROBLEM — Z34.90 NORMAL PREGNANCY: Status: RESOLVED | Noted: 2018-12-19 | Resolved: 2019-01-29

## 2020-10-23 NOTE — ANESTHESIA PREPROCEDURE EVALUATION
PRE-OP EVALUATION    Patient Name: Iva Duran    Pre-op Diagnosis: h/o previable  labor and delivery    Procedure(s): cerlage      Surgeon(s) and Role:     Yoel Rocha MD - Primary    Pre-op vitals reviewed.   Pulse: 80  BP: 121/62     B eyeglasses

## (undated) NOTE — LETTER
[facility]  Laura Chun 59 [address] [facility], [address] [place] [address]    Consent for Operation    Date: __7/11/18___    Time: _______________    1.  I authorize the performance upon Neda Harden the following operation:                              Regine videotape. [facility] will not be responsible for storage or maintenance of this tape. 6. For the purpose of advancing medical education, I consent to the admittance of observers to the Operating Room.     7. I authorize the use of any specimen, organs Signature of Patient:   ___________________________    When the patient is a minor or mentally incompetent to give consent:  Signature of person authorized to consent for patient: ___________________________   Relationship to patient: _____________________ · If I am allergic to anything or have had a reaction to anesthesia before. 3. I understand how the anesthesia medicine will help me (benefits). 4. I understand that with any type of anesthesia medicine there are risks:  a.  The most common risks are: their representative has agreed to have anesthesia services.     _____________________________________________________________________________  Witness        Date   Time  I have verified that the signature is that of the patient or patient’s representative

## (undated) NOTE — Clinical Note
Continue weekly 17-OHPC until 36 weeks Fetal echocardiogram & CL is ~3 weeks Follow-up Growth ultrasound at 32 weeks. Weekly NST's at 34 weeks. Twice weekly testing at 38 weeks - weekly NST and weekly BPP. Delivery at 39-40 weeks.

## (undated) NOTE — Clinical Note
1.  Weekly NST at  34 wks \\  Twice weekly testing at 38 weeks - weekly NST and weekly BPP. 2. Follow-up Growth ultrasound at 32 weeks. 3. Delivery at 39-40 weeks4.   Continue routine care with Dr. Burrell Spar

## (undated) NOTE — LETTER
[facility]  Laura Chun 59 [address] [facility], [address] [place] [address]    Consent for Operation    Date: __________________    Time: _______________    1.  I authorize the performance upon David Page the following operation:                              Sarah videotape. [facility] will not be responsible for storage or maintenance of this tape. 6. For the purpose of advancing medical education, I consent to the admittance of observers to the Operating Room.     7. I authorize the use of any specimen, organs Signature of Patient:   ___________________________    When the patient is a minor or mentally incompetent to give consent:  Signature of person authorized to consent for patient: ___________________________   Relationship to patient: _____________________ 3. I understand how the anesthesia medicine will help me (benefits). 4. I understand that with any type of anesthesia medicine there are risks:  a.  The most common risks are: nausea, vomiting, sore throat, muscle soreness, damage to my eyes, mouth, or t _____________________________________________________________________________  Witness        Date   Time  I have verified that the signature is that of the patient or patient’s representative, and that it was signed before the procedure    Page 2 of 2

## (undated) NOTE — LETTER
Dear new mom:    We've missed you! The nurses of [facility] have tried to reach you by phone to ask if you had any questions regarding your health or the care of your new little one.     Please feel free to call your doctor with an

## (undated) NOTE — Clinical Note
Continue care with Dr. Andie Mcknight Cervical cerclage 7/11/18; assess Cl ~ 1 week later Begin weekly 17-OHPC at 16 weeks Follow-up Growth ultrasound at 32 weeks. Weekly NST's at 34 weeks. Twice weekly testing at 38 weeks - weekly NST and weekly BPP.  Delivery at 3